# Patient Record
Sex: MALE | Race: WHITE | Employment: PART TIME | URBAN - METROPOLITAN AREA
[De-identification: names, ages, dates, MRNs, and addresses within clinical notes are randomized per-mention and may not be internally consistent; named-entity substitution may affect disease eponyms.]

---

## 2021-04-19 ENCOUNTER — HOSPITAL ENCOUNTER (EMERGENCY)
Facility: CLINIC | Age: 46
Discharge: NURSING, REHAB OR LONG-TERM CARE | End: 2021-04-19
Attending: EMERGENCY MEDICINE | Admitting: EMERGENCY MEDICINE
Payer: MEDICARE

## 2021-04-19 VITALS
SYSTOLIC BLOOD PRESSURE: 119 MMHG | DIASTOLIC BLOOD PRESSURE: 77 MMHG | HEART RATE: 91 BPM | TEMPERATURE: 98.7 F | OXYGEN SATURATION: 99 % | RESPIRATION RATE: 20 BRPM

## 2021-04-19 DIAGNOSIS — R11.2 NON-INTRACTABLE VOMITING WITH NAUSEA, UNSPECIFIED VOMITING TYPE: ICD-10-CM

## 2021-04-19 LAB
ALBUMIN SERPL-MCNC: 3.7 G/DL (ref 3.4–5)
ALP SERPL-CCNC: 84 U/L (ref 40–150)
ALT SERPL W P-5'-P-CCNC: 38 U/L (ref 0–70)
ANION GAP SERPL CALCULATED.3IONS-SCNC: 8 MMOL/L (ref 3–14)
AST SERPL W P-5'-P-CCNC: 26 U/L (ref 0–45)
BASOPHILS # BLD AUTO: 0.1 10E9/L (ref 0–0.2)
BASOPHILS NFR BLD AUTO: 0.5 %
BILIRUB SERPL-MCNC: 0.5 MG/DL (ref 0.2–1.3)
BUN SERPL-MCNC: 11 MG/DL (ref 7–30)
CALCIUM SERPL-MCNC: 8.5 MG/DL (ref 8.5–10.1)
CHLORIDE SERPL-SCNC: 98 MMOL/L (ref 94–109)
CO2 SERPL-SCNC: 25 MMOL/L (ref 20–32)
CREAT SERPL-MCNC: 1.04 MG/DL (ref 0.66–1.25)
DIFFERENTIAL METHOD BLD: ABNORMAL
EOSINOPHIL # BLD AUTO: 0 10E9/L (ref 0–0.7)
EOSINOPHIL NFR BLD AUTO: 0.1 %
ERYTHROCYTE [DISTWIDTH] IN BLOOD BY AUTOMATED COUNT: 12.9 % (ref 10–15)
GFR SERPL CREATININE-BSD FRML MDRD: 86 ML/MIN/{1.73_M2}
GLUCOSE SERPL-MCNC: 128 MG/DL (ref 70–99)
HCT VFR BLD AUTO: 49.2 % (ref 40–53)
HGB BLD-MCNC: 16.5 G/DL (ref 13.3–17.7)
IMM GRANULOCYTES # BLD: 0.1 10E9/L (ref 0–0.4)
IMM GRANULOCYTES NFR BLD: 0.6 %
LABORATORY COMMENT REPORT: NORMAL
LIPASE SERPL-CCNC: 134 U/L (ref 73–393)
LYMPHOCYTES # BLD AUTO: 0.5 10E9/L (ref 0.8–5.3)
LYMPHOCYTES NFR BLD AUTO: 3.8 %
MCH RBC QN AUTO: 30.7 PG (ref 26.5–33)
MCHC RBC AUTO-ENTMCNC: 33.5 G/DL (ref 31.5–36.5)
MCV RBC AUTO: 91 FL (ref 78–100)
MONOCYTES # BLD AUTO: 0.5 10E9/L (ref 0–1.3)
MONOCYTES NFR BLD AUTO: 3.9 %
NEUTROPHILS # BLD AUTO: 11.5 10E9/L (ref 1.6–8.3)
NEUTROPHILS NFR BLD AUTO: 91.1 %
NRBC # BLD AUTO: 0 10*3/UL
NRBC BLD AUTO-RTO: 0 /100
PLATELET # BLD AUTO: 295 10E9/L (ref 150–450)
POTASSIUM SERPL-SCNC: 4.1 MMOL/L (ref 3.4–5.3)
PROT SERPL-MCNC: 7.7 G/DL (ref 6.8–8.8)
RBC # BLD AUTO: 5.38 10E12/L (ref 4.4–5.9)
SARS-COV-2 RNA RESP QL NAA+PROBE: NEGATIVE
SODIUM SERPL-SCNC: 131 MMOL/L (ref 133–144)
SPECIMEN SOURCE: NORMAL
TROPONIN I SERPL-MCNC: <0.015 UG/L (ref 0–0.04)
WBC # BLD AUTO: 12.6 10E9/L (ref 4–11)

## 2021-04-19 PROCEDURE — 258N000003 HC RX IP 258 OP 636: Performed by: EMERGENCY MEDICINE

## 2021-04-19 PROCEDURE — 96360 HYDRATION IV INFUSION INIT: CPT

## 2021-04-19 PROCEDURE — 80053 COMPREHEN METABOLIC PANEL: CPT | Performed by: EMERGENCY MEDICINE

## 2021-04-19 PROCEDURE — 87635 SARS-COV-2 COVID-19 AMP PRB: CPT | Performed by: EMERGENCY MEDICINE

## 2021-04-19 PROCEDURE — 83690 ASSAY OF LIPASE: CPT | Performed by: EMERGENCY MEDICINE

## 2021-04-19 PROCEDURE — 84484 ASSAY OF TROPONIN QUANT: CPT | Performed by: EMERGENCY MEDICINE

## 2021-04-19 PROCEDURE — 93005 ELECTROCARDIOGRAM TRACING: CPT

## 2021-04-19 PROCEDURE — 99284 EMERGENCY DEPT VISIT MOD MDM: CPT | Mod: 25

## 2021-04-19 PROCEDURE — 250N000011 HC RX IP 250 OP 636: Performed by: EMERGENCY MEDICINE

## 2021-04-19 PROCEDURE — 85025 COMPLETE CBC W/AUTO DIFF WBC: CPT | Performed by: EMERGENCY MEDICINE

## 2021-04-19 PROCEDURE — C9803 HOPD COVID-19 SPEC COLLECT: HCPCS

## 2021-04-19 RX ORDER — ONDANSETRON 4 MG/1
4 TABLET, ORALLY DISINTEGRATING ORAL EVERY 8 HOURS PRN
Qty: 10 TABLET | Refills: 0 | Status: SHIPPED | OUTPATIENT
Start: 2021-04-19 | End: 2021-04-22

## 2021-04-19 RX ORDER — ONDANSETRON 4 MG/1
4 TABLET, ORALLY DISINTEGRATING ORAL ONCE
Status: COMPLETED | OUTPATIENT
Start: 2021-04-19 | End: 2021-04-19

## 2021-04-19 RX ADMIN — SODIUM CHLORIDE 1000 ML: 9 INJECTION, SOLUTION INTRAVENOUS at 11:50

## 2021-04-19 RX ADMIN — ONDANSETRON 4 MG: 4 TABLET, ORALLY DISINTEGRATING ORAL at 12:11

## 2021-04-19 ASSESSMENT — ENCOUNTER SYMPTOMS
ABDOMINAL PAIN: 0
SHORTNESS OF BREATH: 0
COUGH: 1
VOMITING: 1
DIARRHEA: 1
HEADACHES: 0

## 2021-04-19 NOTE — ED PROVIDER NOTES
History   Chief Complaint:  Vomiting    The history is limited by the absence of a caregiver.      Henry Benson is a 45 year old male with history of down's syndrome and asthma who presents via EMS from group home with vomiting. Per EMS report the patient has been vomiting for the past 2 days. He lives in a group home and COVID has been going around the facility. He also notes the onset of a dry cough this morning, and endorses intermittent diarrhea. Denies any shortness of breath, abdominal pain, or headache.     Review of Systems   Respiratory: Positive for cough. Negative for shortness of breath.    Gastrointestinal: Positive for diarrhea and vomiting. Negative for abdominal pain.   Neurological: Negative for headaches.   All other systems reviewed and are negative.    Allergies:  Penicillins  Sulfa Drugs    Medications:  The patient is currently on no regular medications.    Past Medical History:    Down's syndrome  Bilateral hearing loss  Tinnitus  Use of hearing aid  Hypertropia  Ptosis eyelid  Pneumonia   Asthma    Past Surgical History:    ENT surgery      Family History:    Glaucoma  Hypertension    Social History:  Patient presents with EMS.  Lives in group home.    Physical Exam     Patient Vitals for the past 24 hrs:   BP Temp Temp src Pulse Resp SpO2   04/19/21 1315 119/77 -- -- 91 -- 99 %   04/19/21 1300 117/77 -- -- 109 -- 94 %   04/19/21 1245 103/67 -- -- 84 -- 96 %   04/19/21 1230 110/79 -- -- 90 -- 97 %   04/19/21 1145 122/85 98.7  F (37.1  C) Oral 92 20 97 %       Physical Exam  General: Patient is awake, alert and interactive when I enter the room  Head: The scalp, face, and head appear normal  Eyes: The pupils are equal, round, and reactive to light. Conjunctivae and sclerae are normal  Neck: Normal range of motion.   CV: Regular rate and rhythm.   Resp: Lungs are clear without wheezes or rales. No respiratory distress.   GI: Abdomen is soft, no rigidity, guarding, or rebound. No  distension. No tenderness to palpation in any quadrant.     MS: Normal tone. Joints grossly normal without effusions. No asymmetric leg swelling, calf or thigh tenderness.    Skin: No rash or lesions noted. Normal capillary refill noted  Neuro: Speech is normal and fluent. Face is symmetric. Moving all extremities.   Psych:  Normal affect.  Appropriate interactions.    Emergency Department Course     ECG  ECG taken at 1227, ECG read at 1233  NSR, Normal ECG   Rate 84 bpm. TX interval 148 ms. QRS duration 86 ms. QT/QTc 356/420 ms. P-R-T axes 43 64 16.     Laboratory:    CBC: WBC: 12.6 (H), HGB: 16.5, PLT: 295  CMP: Glucose 128 (H), Sodium: 131 (L), o/w WNL (Creatinine: 1.04)    Troponin (Collected 1147): <0.015  Lipase: 134    Symptomatic COVID PCR: Negative    Emergency Department Course:    Reviewed:  I reviewed the patient's nursing notes, vitals, past medical records, Care Everywhere.     Assessments:  1213    I evaluated the patient and performed an exam as above.  1415    I updated the patient on results and discussed plan of care.    Interventions:  1211 Zofran, 4 mg, Oral  1150 NS, 1 L, IV    Disposition:  The patient was discharged to home.     Impression & Plan     Medical Decision Making:  Herny Benson is a 45 year old male who presents for evaluation of vomiting. Per EMS report the patient has been vomiting for the past 2 days. He lives in a group home and COVID has been going around the facility. Patient's covid swab is negative. his abdominal exam is benign without any guarding or rebound. Blood is reassuring here today. Given that the patient is otherwise hemodynamically stable without significant hypoxia, I do not believe that the patient requires admission here today. They are at risk for pneumonia but no signs of this are detected on today's visit. Return to the ED for high fevers > 103 for more than 48 hours more, increasing productive cough, shortness of breath, or confusion. Tolerating PO.  There is no signs of serious bacterial infection such as bacteremia, meningitis, UTI/pyelonephritis, strep pharyngitis, etc. I discussed my findings and plan with the patient and they are amenable at this time.  All questions were answered and patient will be discharged home in stable condition.     Covid-19  Henry Benson was evaluated during a global COVID-19 pandemic, which necessitated consideration that the patient might be at risk for infection with the SARS-CoV-2 virus that causes COVID-19.   Applicable protocols for evaluation were followed during the patient's care.   COVID-19 was considered as part of the patient's evaluation. The plan for testing is:  a test was obtained during this visit.    Diagnosis:    ICD-10-CM    1. Non-intractable vomiting with nausea, unspecified vomiting type  R11.2        Discharge Medications:  New Prescriptions    ONDANSETRON (ZOFRAN ODT) 4 MG ODT TAB    Take 1 tablet (4 mg) by mouth every 8 hours as needed for nausea       Scribe Disclosure:  I, Cecille Terrell, am serving as a scribe at 11:45 AM on 4/19/2021 to document services personally performed by Marlon Mcneil MD based on my observations and the provider's statements to me.       Scribe Disclosure:  I, Shannan Beyer, am serving as a scribe at 2:14 PM on 4/19/2021 to document services personally performed by Marlon Mcneil MD based on my observations and the provider's statements to me.         Marlon Mcneil MD  04/20/21 1535

## 2021-04-19 NOTE — ED TRIAGE NOTES
A&O x4.  ABC's intact.      Pt arrives with c/o vomiting for the past 2 days. Pt lives at group home and COVID is going around at home facility.

## 2021-04-20 LAB — INTERPRETATION ECG - MUSE: NORMAL

## 2024-06-13 ENCOUNTER — LAB REQUISITION (OUTPATIENT)
Dept: LAB | Facility: CLINIC | Age: 49
End: 2024-06-13
Payer: COMMERCIAL

## 2024-06-13 DIAGNOSIS — F32.5 MAJOR DEPRESSIVE DISORDER, SINGLE EPISODE, IN FULL REMISSION (H): ICD-10-CM

## 2024-06-13 DIAGNOSIS — G47.00 INSOMNIA, UNSPECIFIED: ICD-10-CM

## 2024-06-13 LAB
ALBUMIN SERPL BCG-MCNC: 4 G/DL (ref 3.5–5.2)
ALP SERPL-CCNC: 84 U/L (ref 40–150)
ALT SERPL W P-5'-P-CCNC: 26 U/L (ref 0–70)
ANION GAP SERPL CALCULATED.3IONS-SCNC: 10 MMOL/L (ref 7–15)
AST SERPL W P-5'-P-CCNC: 31 U/L (ref 0–45)
BASOPHILS # BLD AUTO: 0.1 10E3/UL (ref 0–0.2)
BASOPHILS NFR BLD AUTO: 2 %
BILIRUB SERPL-MCNC: 0.5 MG/DL
BUN SERPL-MCNC: 14.3 MG/DL (ref 6–20)
CALCIUM SERPL-MCNC: 9 MG/DL (ref 8.6–10)
CHLORIDE SERPL-SCNC: 91 MMOL/L (ref 98–107)
CREAT SERPL-MCNC: 1.04 MG/DL (ref 0.67–1.17)
DEPRECATED HCO3 PLAS-SCNC: 27 MMOL/L (ref 22–29)
EGFRCR SERPLBLD CKD-EPI 2021: 89 ML/MIN/1.73M2
EOSINOPHIL # BLD AUTO: 0.1 10E3/UL (ref 0–0.7)
EOSINOPHIL NFR BLD AUTO: 2 %
GLUCOSE SERPL-MCNC: 76 MG/DL (ref 70–99)
HOLD SPECIMEN: NORMAL
IMM GRANULOCYTES # BLD: 0 10E3/UL
IMM GRANULOCYTES NFR BLD: 0 %
LYMPHOCYTES # BLD AUTO: 1.9 10E3/UL (ref 0.8–5.3)
LYMPHOCYTES NFR BLD AUTO: 24 %
MONOCYTES # BLD AUTO: 0.6 10E3/UL (ref 0–1.3)
MONOCYTES NFR BLD AUTO: 8 %
NEUTROPHILS # BLD AUTO: 4.9 10E3/UL (ref 1.6–8.3)
NEUTROPHILS NFR BLD AUTO: 64 %
NRBC # BLD AUTO: 0 10E3/UL
NRBC BLD AUTO-RTO: 0 /100
POTASSIUM SERPL-SCNC: 4.1 MMOL/L (ref 3.4–5.3)
PROT SERPL-MCNC: 7.4 G/DL (ref 6.4–8.3)
SODIUM SERPL-SCNC: 128 MMOL/L (ref 135–145)
TSH SERPL DL<=0.005 MIU/L-ACNC: 0.94 UIU/ML (ref 0.3–4.2)
WBC # BLD AUTO: 7.6 10E3/UL (ref 4–11)

## 2024-06-13 PROCEDURE — 85048 AUTOMATED LEUKOCYTE COUNT: CPT | Mod: ORL

## 2024-06-13 PROCEDURE — 82607 VITAMIN B-12: CPT | Mod: ORL

## 2024-06-13 PROCEDURE — 80061 LIPID PANEL: CPT | Mod: ORL

## 2024-06-13 PROCEDURE — 80053 COMPREHEN METABOLIC PANEL: CPT | Mod: ORL

## 2024-06-13 PROCEDURE — 82306 VITAMIN D 25 HYDROXY: CPT | Mod: ORL

## 2024-06-13 PROCEDURE — 84443 ASSAY THYROID STIM HORMONE: CPT | Mod: ORL

## 2024-06-14 LAB
CHOLEST SERPL-MCNC: 197 MG/DL
FASTING STATUS PATIENT QL REPORTED: NO
HDLC SERPL-MCNC: 53 MG/DL
LDLC SERPL CALC-MCNC: 115 MG/DL
NONHDLC SERPL-MCNC: 144 MG/DL
TRIGL SERPL-MCNC: 143 MG/DL
VIT B12 SERPL-MCNC: 673 PG/ML (ref 232–1245)
VIT D+METAB SERPL-MCNC: 31 NG/ML (ref 20–50)

## 2024-07-18 ENCOUNTER — LAB REQUISITION (OUTPATIENT)
Dept: LAB | Facility: CLINIC | Age: 49
End: 2024-07-18
Payer: COMMERCIAL

## 2024-07-18 DIAGNOSIS — E87.1 HYPO-OSMOLALITY AND HYPONATREMIA: ICD-10-CM

## 2024-07-18 LAB — SODIUM SERPL-SCNC: 133 MMOL/L (ref 135–145)

## 2024-07-18 PROCEDURE — 84295 ASSAY OF SERUM SODIUM: CPT | Mod: ORL

## 2025-01-25 ENCOUNTER — APPOINTMENT (OUTPATIENT)
Dept: GENERAL RADIOLOGY | Facility: CLINIC | Age: 50
End: 2025-01-25
Attending: EMERGENCY MEDICINE
Payer: COMMERCIAL

## 2025-01-25 ENCOUNTER — HOSPITAL ENCOUNTER (INPATIENT)
Facility: CLINIC | Age: 50
LOS: 3 days | Discharge: NURSING, REHAB OR LONG-TERM CARE | End: 2025-01-29
Attending: EMERGENCY MEDICINE | Admitting: INTERNAL MEDICINE
Payer: COMMERCIAL

## 2025-01-25 DIAGNOSIS — J10.1 INFLUENZA A: ICD-10-CM

## 2025-01-25 DIAGNOSIS — E87.1 HYPONATREMIA: ICD-10-CM

## 2025-01-25 DIAGNOSIS — J18.9 PNEUMONIA DUE TO INFECTIOUS ORGANISM, UNSPECIFIED LATERALITY, UNSPECIFIED PART OF LUNG: ICD-10-CM

## 2025-01-25 LAB
ALBUMIN SERPL BCG-MCNC: 3.9 G/DL (ref 3.5–5.2)
ALP SERPL-CCNC: 67 U/L (ref 40–150)
ALT SERPL W P-5'-P-CCNC: 30 U/L (ref 0–70)
ANION GAP SERPL CALCULATED.3IONS-SCNC: 10 MMOL/L (ref 7–15)
AST SERPL W P-5'-P-CCNC: 63 U/L (ref 0–45)
BASOPHILS # BLD AUTO: 0.1 10E3/UL (ref 0–0.2)
BASOPHILS NFR BLD AUTO: 1 %
BILIRUB SERPL-MCNC: 0.5 MG/DL
BUN SERPL-MCNC: 13.9 MG/DL (ref 6–20)
CALCIUM SERPL-MCNC: 8 MG/DL (ref 8.8–10.4)
CHLORIDE SERPL-SCNC: 80 MMOL/L (ref 98–107)
CREAT SERPL-MCNC: 0.98 MG/DL (ref 0.67–1.17)
EGFRCR SERPLBLD CKD-EPI 2021: >90 ML/MIN/1.73M2
EOSINOPHIL # BLD AUTO: 0 10E3/UL (ref 0–0.7)
EOSINOPHIL NFR BLD AUTO: 0 %
ERYTHROCYTE [DISTWIDTH] IN BLOOD BY AUTOMATED COUNT: 13.6 % (ref 10–15)
FLUAV RNA SPEC QL NAA+PROBE: POSITIVE
FLUBV RNA RESP QL NAA+PROBE: NEGATIVE
GLUCOSE SERPL-MCNC: 102 MG/DL (ref 70–99)
HCO3 SERPL-SCNC: 21 MMOL/L (ref 22–29)
HCT VFR BLD AUTO: 35.1 % (ref 40–53)
HGB BLD-MCNC: 12.6 G/DL (ref 13.3–17.7)
IMM GRANULOCYTES # BLD: 0.1 10E3/UL
IMM GRANULOCYTES NFR BLD: 1 %
LYMPHOCYTES # BLD AUTO: 0.4 10E3/UL (ref 0.8–5.3)
LYMPHOCYTES NFR BLD AUTO: 4 %
MCH RBC QN AUTO: 30.3 PG (ref 26.5–33)
MCHC RBC AUTO-ENTMCNC: 35.9 G/DL (ref 31.5–36.5)
MCV RBC AUTO: 84 FL (ref 78–100)
MONOCYTES # BLD AUTO: 0.6 10E3/UL (ref 0–1.3)
MONOCYTES NFR BLD AUTO: 7 %
NEUTROPHILS # BLD AUTO: 8.1 10E3/UL (ref 1.6–8.3)
NEUTROPHILS NFR BLD AUTO: 88 %
NRBC # BLD AUTO: 0 10E3/UL
NRBC BLD AUTO-RTO: 0 /100
PLATELET # BLD AUTO: 178 10E3/UL (ref 150–450)
POTASSIUM SERPL-SCNC: 3.9 MMOL/L (ref 3.4–5.3)
PROT SERPL-MCNC: 6.8 G/DL (ref 6.4–8.3)
RBC # BLD AUTO: 4.16 10E6/UL (ref 4.4–5.9)
RSV RNA SPEC NAA+PROBE: NEGATIVE
SARS-COV-2 RNA RESP QL NAA+PROBE: NEGATIVE
SODIUM SERPL-SCNC: 111 MMOL/L (ref 135–145)
WBC # BLD AUTO: 9.3 10E3/UL (ref 4–11)

## 2025-01-25 PROCEDURE — 99292 CRITICAL CARE ADDL 30 MIN: CPT

## 2025-01-25 PROCEDURE — 250N000009 HC RX 250: Performed by: EMERGENCY MEDICINE

## 2025-01-25 PROCEDURE — 250N000013 HC RX MED GY IP 250 OP 250 PS 637: Performed by: EMERGENCY MEDICINE

## 2025-01-25 PROCEDURE — 96361 HYDRATE IV INFUSION ADD-ON: CPT | Mod: 59

## 2025-01-25 PROCEDURE — 258N000003 HC RX IP 258 OP 636: Performed by: EMERGENCY MEDICINE

## 2025-01-25 PROCEDURE — 99291 CRITICAL CARE FIRST HOUR: CPT | Mod: 25

## 2025-01-25 PROCEDURE — 83930 ASSAY OF BLOOD OSMOLALITY: CPT | Performed by: EMERGENCY MEDICINE

## 2025-01-25 PROCEDURE — 94640 AIRWAY INHALATION TREATMENT: CPT

## 2025-01-25 PROCEDURE — 82310 ASSAY OF CALCIUM: CPT | Performed by: EMERGENCY MEDICINE

## 2025-01-25 PROCEDURE — 82040 ASSAY OF SERUM ALBUMIN: CPT | Performed by: EMERGENCY MEDICINE

## 2025-01-25 PROCEDURE — 71046 X-RAY EXAM CHEST 2 VIEWS: CPT

## 2025-01-25 PROCEDURE — 96375 TX/PRO/DX INJ NEW DRUG ADDON: CPT | Mod: 59

## 2025-01-25 PROCEDURE — 36415 COLL VENOUS BLD VENIPUNCTURE: CPT | Performed by: EMERGENCY MEDICINE

## 2025-01-25 PROCEDURE — 250N000011 HC RX IP 250 OP 636: Performed by: EMERGENCY MEDICINE

## 2025-01-25 PROCEDURE — 85025 COMPLETE CBC W/AUTO DIFF WBC: CPT | Performed by: EMERGENCY MEDICINE

## 2025-01-25 PROCEDURE — 87637 SARSCOV2&INF A&B&RSV AMP PRB: CPT | Performed by: EMERGENCY MEDICINE

## 2025-01-25 RX ORDER — AZITHROMYCIN 500 MG/5ML
500 INJECTION, POWDER, LYOPHILIZED, FOR SOLUTION INTRAVENOUS EVERY 24 HOURS
Status: COMPLETED | OUTPATIENT
Start: 2025-01-25 | End: 2025-01-26

## 2025-01-25 RX ORDER — DEXAMETHASONE SODIUM PHOSPHATE 10 MG/ML
10 INJECTION, SOLUTION INTRAMUSCULAR; INTRAVENOUS ONCE
Status: COMPLETED | OUTPATIENT
Start: 2025-01-25 | End: 2025-01-25

## 2025-01-25 RX ORDER — ONDANSETRON 2 MG/ML
4 INJECTION INTRAMUSCULAR; INTRAVENOUS ONCE
Status: COMPLETED | OUTPATIENT
Start: 2025-01-25 | End: 2025-01-25

## 2025-01-25 RX ORDER — IPRATROPIUM BROMIDE AND ALBUTEROL SULFATE 2.5; .5 MG/3ML; MG/3ML
3 SOLUTION RESPIRATORY (INHALATION) ONCE
Status: COMPLETED | OUTPATIENT
Start: 2025-01-25 | End: 2025-01-25

## 2025-01-25 RX ORDER — ACETAMINOPHEN 325 MG/1
650 TABLET ORAL ONCE
Status: COMPLETED | OUTPATIENT
Start: 2025-01-25 | End: 2025-01-25

## 2025-01-25 RX ORDER — CEFTRIAXONE 1 G/1
1 INJECTION, POWDER, FOR SOLUTION INTRAMUSCULAR; INTRAVENOUS ONCE
Status: COMPLETED | OUTPATIENT
Start: 2025-01-25 | End: 2025-01-26

## 2025-01-25 RX ORDER — OSELTAMIVIR PHOSPHATE 75 MG/1
75 CAPSULE ORAL ONCE
Status: COMPLETED | OUTPATIENT
Start: 2025-01-25 | End: 2025-01-26

## 2025-01-25 RX ADMIN — IPRATROPIUM BROMIDE AND ALBUTEROL SULFATE 3 ML: .5; 3 SOLUTION RESPIRATORY (INHALATION) at 22:30

## 2025-01-25 RX ADMIN — IPRATROPIUM BROMIDE AND ALBUTEROL SULFATE 3 ML: .5; 3 SOLUTION RESPIRATORY (INHALATION) at 23:25

## 2025-01-25 RX ADMIN — SODIUM CHLORIDE 1000 ML: 9 INJECTION, SOLUTION INTRAVENOUS at 22:31

## 2025-01-25 RX ADMIN — DEXAMETHASONE SODIUM PHOSPHATE 10 MG: 10 INJECTION, SOLUTION INTRAMUSCULAR; INTRAVENOUS at 23:24

## 2025-01-25 RX ADMIN — ONDANSETRON 4 MG: 2 INJECTION INTRAMUSCULAR; INTRAVENOUS at 22:31

## 2025-01-25 RX ADMIN — ACETAMINOPHEN 650 MG: 325 TABLET, FILM COATED ORAL at 22:30

## 2025-01-25 ASSESSMENT — COLUMBIA-SUICIDE SEVERITY RATING SCALE - C-SSRS
1. IN THE PAST MONTH, HAVE YOU WISHED YOU WERE DEAD OR WISHED YOU COULD GO TO SLEEP AND NOT WAKE UP?: NO
6. HAVE YOU EVER DONE ANYTHING, STARTED TO DO ANYTHING, OR PREPARED TO DO ANYTHING TO END YOUR LIFE?: NO
2. HAVE YOU ACTUALLY HAD ANY THOUGHTS OF KILLING YOURSELF IN THE PAST MONTH?: NO

## 2025-01-25 ASSESSMENT — ACTIVITIES OF DAILY LIVING (ADL): ADLS_ACUITY_SCORE: 41

## 2025-01-26 PROBLEM — J18.9 CAP (COMMUNITY ACQUIRED PNEUMONIA): Status: ACTIVE | Noted: 2025-01-26

## 2025-01-26 PROBLEM — E87.1 HYPONATREMIA: Status: ACTIVE | Noted: 2025-01-26

## 2025-01-26 PROBLEM — J10.1 INFLUENZA A: Status: ACTIVE | Noted: 2025-01-26

## 2025-01-26 PROBLEM — J18.9 PNEUMONIA DUE TO INFECTIOUS ORGANISM, UNSPECIFIED LATERALITY, UNSPECIFIED PART OF LUNG: Status: ACTIVE | Noted: 2025-01-26

## 2025-01-26 PROBLEM — J96.01 ACUTE HYPOXEMIC RESPIRATORY FAILURE (H): Status: ACTIVE | Noted: 2025-01-26

## 2025-01-26 LAB
ALBUMIN UR-MCNC: NEGATIVE MG/DL
ANION GAP SERPL CALCULATED.3IONS-SCNC: 10 MMOL/L (ref 7–15)
APPEARANCE UR: CLEAR
BASE EXCESS BLDV CALC-SCNC: -0.6 MMOL/L (ref -3–3)
BILIRUB UR QL STRIP: NEGATIVE
BUN SERPL-MCNC: 14.3 MG/DL (ref 6–20)
CALCIUM SERPL-MCNC: 7.9 MG/DL (ref 8.8–10.4)
CHLORIDE SERPL-SCNC: 85 MMOL/L (ref 98–107)
COLOR UR AUTO: ABNORMAL
CREAT SERPL-MCNC: 1.01 MG/DL (ref 0.67–1.17)
EGFRCR SERPLBLD CKD-EPI 2021: >90 ML/MIN/1.73M2
ERYTHROCYTE [DISTWIDTH] IN BLOOD BY AUTOMATED COUNT: 13.9 % (ref 10–15)
GLUCOSE BLDC GLUCOMTR-MCNC: 145 MG/DL (ref 70–99)
GLUCOSE BLDC GLUCOMTR-MCNC: 169 MG/DL (ref 70–99)
GLUCOSE BLDC GLUCOMTR-MCNC: 189 MG/DL (ref 70–99)
GLUCOSE SERPL-MCNC: 146 MG/DL (ref 70–99)
GLUCOSE UR STRIP-MCNC: NEGATIVE MG/DL
HCO3 BLDV-SCNC: 24 MMOL/L (ref 21–28)
HCO3 SERPL-SCNC: 21 MMOL/L (ref 22–29)
HCT VFR BLD AUTO: 34 % (ref 40–53)
HGB BLD-MCNC: 12.2 G/DL (ref 13.3–17.7)
HGB UR QL STRIP: ABNORMAL
KETONES UR STRIP-MCNC: NEGATIVE MG/DL
LEUKOCYTE ESTERASE UR QL STRIP: NEGATIVE
MCH RBC QN AUTO: 30.3 PG (ref 26.5–33)
MCHC RBC AUTO-ENTMCNC: 35.9 G/DL (ref 31.5–36.5)
MCV RBC AUTO: 84 FL (ref 78–100)
NITRATE UR QL: NEGATIVE
O2/TOTAL GAS SETTING VFR VENT: 6 %
OSMOLALITY SERPL: 237 MMOL/KG (ref 275–295)
OSMOLALITY UR: 173 MMOL/KG (ref 100–1200)
OXYHGB MFR BLDV: 93 % (ref 70–75)
PCO2 BLDV: 36 MM HG (ref 40–50)
PH BLDV: 7.43 [PH] (ref 7.32–7.43)
PH UR STRIP: 6.5 [PH] (ref 5–7)
PLATELET # BLD AUTO: 153 10E3/UL (ref 150–450)
PO2 BLDV: 63 MM HG (ref 25–47)
POTASSIUM SERPL-SCNC: 3.6 MMOL/L (ref 3.4–5.3)
RBC # BLD AUTO: 4.03 10E6/UL (ref 4.4–5.9)
RBC URINE: 1 /HPF
SAO2 % BLDV: 94.6 % (ref 70–75)
SODIUM SERPL-SCNC: 114 MMOL/L (ref 135–145)
SODIUM SERPL-SCNC: 116 MMOL/L (ref 135–145)
SODIUM SERPL-SCNC: 116 MMOL/L (ref 135–145)
SODIUM SERPL-SCNC: 124 MMOL/L (ref 135–145)
SODIUM SERPL-SCNC: 127 MMOL/L (ref 135–145)
SODIUM SERPL-SCNC: 128 MMOL/L (ref 135–145)
SODIUM SERPL-SCNC: 130 MMOL/L (ref 135–145)
SODIUM UR-SCNC: <20 MMOL/L
SP GR UR STRIP: 1 (ref 1–1.03)
UROBILINOGEN UR STRIP-MCNC: NORMAL MG/DL
WBC # BLD AUTO: 7.9 10E3/UL (ref 4–11)
WBC URINE: <1 /HPF

## 2025-01-26 PROCEDURE — 36415 COLL VENOUS BLD VENIPUNCTURE: CPT | Performed by: INTERNAL MEDICINE

## 2025-01-26 PROCEDURE — 250N000013 HC RX MED GY IP 250 OP 250 PS 637: Performed by: INTERNAL MEDICINE

## 2025-01-26 PROCEDURE — 85014 HEMATOCRIT: CPT | Performed by: INTERNAL MEDICINE

## 2025-01-26 PROCEDURE — 99223 1ST HOSP IP/OBS HIGH 75: CPT | Performed by: INTERNAL MEDICINE

## 2025-01-26 PROCEDURE — 84300 ASSAY OF URINE SODIUM: CPT | Performed by: INTERNAL MEDICINE

## 2025-01-26 PROCEDURE — 36415 COLL VENOUS BLD VENIPUNCTURE: CPT | Performed by: EMERGENCY MEDICINE

## 2025-01-26 PROCEDURE — 81001 URINALYSIS AUTO W/SCOPE: CPT | Performed by: EMERGENCY MEDICINE

## 2025-01-26 PROCEDURE — 250N000009 HC RX 250: Performed by: EMERGENCY MEDICINE

## 2025-01-26 PROCEDURE — 96367 TX/PROPH/DG ADDL SEQ IV INF: CPT | Mod: 59

## 2025-01-26 PROCEDURE — 250N000013 HC RX MED GY IP 250 OP 250 PS 637: Performed by: EMERGENCY MEDICINE

## 2025-01-26 PROCEDURE — 96375 TX/PRO/DX INJ NEW DRUG ADDON: CPT | Mod: 59

## 2025-01-26 PROCEDURE — 84295 ASSAY OF SERUM SODIUM: CPT | Performed by: INTERNAL MEDICINE

## 2025-01-26 PROCEDURE — 250N000011 HC RX IP 250 OP 636: Performed by: INTERNAL MEDICINE

## 2025-01-26 PROCEDURE — 250N000009 HC RX 250: Performed by: INTERNAL MEDICINE

## 2025-01-26 PROCEDURE — 85041 AUTOMATED RBC COUNT: CPT | Performed by: INTERNAL MEDICINE

## 2025-01-26 PROCEDURE — 258N000003 HC RX IP 258 OP 636: Performed by: INTERNAL MEDICINE

## 2025-01-26 PROCEDURE — 999N000157 HC STATISTIC RCP TIME EA 10 MIN

## 2025-01-26 PROCEDURE — 96365 THER/PROPH/DIAG IV INF INIT: CPT | Mod: 59

## 2025-01-26 PROCEDURE — 250N000011 HC RX IP 250 OP 636: Performed by: EMERGENCY MEDICINE

## 2025-01-26 PROCEDURE — 80048 BASIC METABOLIC PNL TOTAL CA: CPT | Performed by: INTERNAL MEDICINE

## 2025-01-26 PROCEDURE — 120N000004 HC R&B MS OVERFLOW

## 2025-01-26 PROCEDURE — 80051 ELECTROLYTE PANEL: CPT | Performed by: INTERNAL MEDICINE

## 2025-01-26 PROCEDURE — 83935 ASSAY OF URINE OSMOLALITY: CPT | Performed by: INTERNAL MEDICINE

## 2025-01-26 PROCEDURE — 94640 AIRWAY INHALATION TREATMENT: CPT

## 2025-01-26 PROCEDURE — 94640 AIRWAY INHALATION TREATMENT: CPT | Mod: 76

## 2025-01-26 PROCEDURE — 99222 1ST HOSP IP/OBS MODERATE 55: CPT | Performed by: INTERNAL MEDICINE

## 2025-01-26 PROCEDURE — 82805 BLOOD GASES W/O2 SATURATION: CPT | Performed by: EMERGENCY MEDICINE

## 2025-01-26 RX ORDER — MEMANTINE HYDROCHLORIDE 10 MG/1
10 TABLET ORAL 2 TIMES DAILY
COMMUNITY

## 2025-01-26 RX ORDER — ONDANSETRON 4 MG/1
4 TABLET, ORALLY DISINTEGRATING ORAL EVERY 8 HOURS PRN
Status: DISCONTINUED | OUTPATIENT
Start: 2025-01-26 | End: 2025-01-26

## 2025-01-26 RX ORDER — ENOXAPARIN SODIUM 100 MG/ML
40 INJECTION SUBCUTANEOUS EVERY 24 HOURS
Status: DISCONTINUED | OUTPATIENT
Start: 2025-01-26 | End: 2025-01-29 | Stop reason: HOSPADM

## 2025-01-26 RX ORDER — ONDANSETRON 4 MG/1
4 TABLET, ORALLY DISINTEGRATING ORAL EVERY 8 HOURS PRN
COMMUNITY

## 2025-01-26 RX ORDER — CETIRIZINE HYDROCHLORIDE 10 MG/1
10 TABLET ORAL DAILY
Status: DISCONTINUED | OUTPATIENT
Start: 2025-01-26 | End: 2025-01-29 | Stop reason: HOSPADM

## 2025-01-26 RX ORDER — CALCIUM CARBONATE 500(1250)
1 TABLET ORAL 2 TIMES DAILY
COMMUNITY

## 2025-01-26 RX ORDER — DEXTROSE MONOHYDRATE 50 MG/ML
INJECTION, SOLUTION INTRAVENOUS CONTINUOUS
Status: DISCONTINUED | OUTPATIENT
Start: 2025-01-26 | End: 2025-01-27

## 2025-01-26 RX ORDER — MOMETASONE FUROATE 1 MG/G
CREAM TOPICAL DAILY
COMMUNITY

## 2025-01-26 RX ORDER — CARBOXYMETHYLCELLULOSE SODIUM 5 MG/ML
1 SOLUTION/ DROPS OPHTHALMIC
Status: DISCONTINUED | OUTPATIENT
Start: 2025-01-26 | End: 2025-01-29 | Stop reason: HOSPADM

## 2025-01-26 RX ORDER — OLANZAPINE 10 MG/1
2.5 INJECTION, POWDER, LYOPHILIZED, FOR SOLUTION INTRAMUSCULAR ONCE
Status: DISCONTINUED | OUTPATIENT
Start: 2025-01-26 | End: 2025-01-26

## 2025-01-26 RX ORDER — DEXTROSE MONOHYDRATE 25 G/50ML
25-50 INJECTION, SOLUTION INTRAVENOUS
Status: DISCONTINUED | OUTPATIENT
Start: 2025-01-26 | End: 2025-01-29 | Stop reason: HOSPADM

## 2025-01-26 RX ORDER — DONEPEZIL HYDROCHLORIDE 5 MG/1
10 TABLET, FILM COATED ORAL AT BEDTIME
Status: DISCONTINUED | OUTPATIENT
Start: 2025-01-26 | End: 2025-01-29 | Stop reason: HOSPADM

## 2025-01-26 RX ORDER — CALCIUM CARBONATE 500(1250)
1 TABLET ORAL 2 TIMES DAILY
Status: DISCONTINUED | OUTPATIENT
Start: 2025-01-26 | End: 2025-01-29 | Stop reason: HOSPADM

## 2025-01-26 RX ORDER — NICOTINE POLACRILEX 4 MG
15-30 LOZENGE BUCCAL
Status: DISCONTINUED | OUTPATIENT
Start: 2025-01-26 | End: 2025-01-29 | Stop reason: HOSPADM

## 2025-01-26 RX ORDER — AMOXICILLIN 250 MG
1 CAPSULE ORAL 2 TIMES DAILY PRN
Status: DISCONTINUED | OUTPATIENT
Start: 2025-01-26 | End: 2025-01-29 | Stop reason: HOSPADM

## 2025-01-26 RX ORDER — ONDANSETRON 4 MG/1
4 TABLET, ORALLY DISINTEGRATING ORAL EVERY 6 HOURS PRN
Status: DISCONTINUED | OUTPATIENT
Start: 2025-01-26 | End: 2025-01-29 | Stop reason: HOSPADM

## 2025-01-26 RX ORDER — OSELTAMIVIR PHOSPHATE 75 MG/1
75 CAPSULE ORAL 2 TIMES DAILY
Status: DISCONTINUED | OUTPATIENT
Start: 2025-01-26 | End: 2025-01-29 | Stop reason: HOSPADM

## 2025-01-26 RX ORDER — AZITHROMYCIN 250 MG/1
250 TABLET, FILM COATED ORAL EVERY EVENING
Status: DISCONTINUED | OUTPATIENT
Start: 2025-01-26 | End: 2025-01-29 | Stop reason: HOSPADM

## 2025-01-26 RX ORDER — MEMANTINE HYDROCHLORIDE 5 MG/1
10 TABLET ORAL 2 TIMES DAILY
Status: DISCONTINUED | OUTPATIENT
Start: 2025-01-26 | End: 2025-01-29 | Stop reason: HOSPADM

## 2025-01-26 RX ORDER — SODIUM CHLORIDE 9 MG/ML
INJECTION, SOLUTION INTRAVENOUS CONTINUOUS
Status: DISCONTINUED | OUTPATIENT
Start: 2025-01-26 | End: 2025-01-26

## 2025-01-26 RX ORDER — DONEPEZIL HYDROCHLORIDE 10 MG/1
10 TABLET, FILM COATED ORAL AT BEDTIME
COMMUNITY

## 2025-01-26 RX ORDER — ACETAMINOPHEN 500 MG
1000 TABLET ORAL 3 TIMES DAILY
Status: DISCONTINUED | OUTPATIENT
Start: 2025-01-26 | End: 2025-01-29 | Stop reason: HOSPADM

## 2025-01-26 RX ORDER — GUAIFENESIN 200 MG/10ML
200 LIQUID ORAL EVERY 4 HOURS PRN
Status: DISCONTINUED | OUTPATIENT
Start: 2025-01-26 | End: 2025-01-29 | Stop reason: HOSPADM

## 2025-01-26 RX ORDER — BENZONATATE 100 MG/1
100 CAPSULE ORAL 3 TIMES DAILY PRN
Status: DISCONTINUED | OUTPATIENT
Start: 2025-01-26 | End: 2025-01-29 | Stop reason: HOSPADM

## 2025-01-26 RX ORDER — IPRATROPIUM BROMIDE AND ALBUTEROL SULFATE 2.5; .5 MG/3ML; MG/3ML
3 SOLUTION RESPIRATORY (INHALATION) EVERY 4 HOURS PRN
Status: DISCONTINUED | OUTPATIENT
Start: 2025-01-26 | End: 2025-01-29 | Stop reason: HOSPADM

## 2025-01-26 RX ORDER — ESCITALOPRAM OXALATE 20 MG/1
20 TABLET ORAL DAILY
Status: DISCONTINUED | OUTPATIENT
Start: 2025-01-26 | End: 2025-01-29 | Stop reason: HOSPADM

## 2025-01-26 RX ORDER — FLUTICASONE PROPIONATE 50 MCG
1 SPRAY, SUSPENSION (ML) NASAL DAILY
Status: DISCONTINUED | OUTPATIENT
Start: 2025-01-27 | End: 2025-01-26

## 2025-01-26 RX ORDER — FLUTICASONE PROPIONATE 50 MCG
1 SPRAY, SUSPENSION (ML) NASAL DAILY
Status: DISCONTINUED | OUTPATIENT
Start: 2025-01-26 | End: 2025-01-29 | Stop reason: HOSPADM

## 2025-01-26 RX ORDER — ACETYLCYSTEINE 200 MG/ML
2 SOLUTION ORAL; RESPIRATORY (INHALATION) 3 TIMES DAILY
Status: DISCONTINUED | OUTPATIENT
Start: 2025-01-26 | End: 2025-01-29 | Stop reason: HOSPADM

## 2025-01-26 RX ORDER — IBUPROFEN 200 MG
200 TABLET ORAL EVERY 4 HOURS PRN
COMMUNITY

## 2025-01-26 RX ORDER — ACETAMINOPHEN 500 MG
1000 TABLET ORAL 3 TIMES DAILY
COMMUNITY

## 2025-01-26 RX ORDER — AMOXICILLIN 250 MG
2 CAPSULE ORAL 2 TIMES DAILY PRN
Status: DISCONTINUED | OUTPATIENT
Start: 2025-01-26 | End: 2025-01-29 | Stop reason: HOSPADM

## 2025-01-26 RX ORDER — FLUTICASONE PROPIONATE 50 MCG
1 SPRAY, SUSPENSION (ML) NASAL DAILY
COMMUNITY

## 2025-01-26 RX ORDER — LIDOCAINE 40 MG/G
CREAM TOPICAL
Status: DISCONTINUED | OUTPATIENT
Start: 2025-01-26 | End: 2025-01-29 | Stop reason: HOSPADM

## 2025-01-26 RX ORDER — CALCIUM CARBONATE 500 MG/1
1000 TABLET, CHEWABLE ORAL 4 TIMES DAILY PRN
Status: DISCONTINUED | OUTPATIENT
Start: 2025-01-26 | End: 2025-01-29 | Stop reason: HOSPADM

## 2025-01-26 RX ORDER — BENZOCAINE/MENTHOL 6 MG-10 MG
LOZENGE MUCOUS MEMBRANE 2 TIMES DAILY PRN
COMMUNITY

## 2025-01-26 RX ORDER — DILTIAZEM HYDROCHLORIDE 240 MG/1
240 CAPSULE, EXTENDED RELEASE ORAL DAILY
Status: DISCONTINUED | OUTPATIENT
Start: 2025-01-26 | End: 2025-01-26 | Stop reason: ALTCHOICE

## 2025-01-26 RX ORDER — CHOLECALCIFEROL (VITAMIN D3) 125 MCG
9000-27000 CAPSULE ORAL
Status: DISCONTINUED | OUTPATIENT
Start: 2025-01-26 | End: 2025-01-29 | Stop reason: HOSPADM

## 2025-01-26 RX ORDER — PROCHLORPERAZINE MALEATE 5 MG/1
10 TABLET ORAL EVERY 6 HOURS PRN
Status: DISCONTINUED | OUTPATIENT
Start: 2025-01-26 | End: 2025-01-29 | Stop reason: HOSPADM

## 2025-01-26 RX ORDER — TACROLIMUS 0.3 MG/G
OINTMENT TOPICAL 2 TIMES DAILY PRN
COMMUNITY

## 2025-01-26 RX ORDER — CETIRIZINE HYDROCHLORIDE 10 MG/1
10 TABLET ORAL DAILY
COMMUNITY

## 2025-01-26 RX ORDER — CEFTRIAXONE 2 G/1
2 INJECTION, POWDER, FOR SOLUTION INTRAMUSCULAR; INTRAVENOUS EVERY 24 HOURS
Status: DISCONTINUED | OUTPATIENT
Start: 2025-01-26 | End: 2025-01-28

## 2025-01-26 RX ORDER — ACETAMINOPHEN 325 MG/1
975 TABLET ORAL EVERY 6 HOURS PRN
Status: DISCONTINUED | OUTPATIENT
Start: 2025-01-26 | End: 2025-01-29 | Stop reason: HOSPADM

## 2025-01-26 RX ORDER — ESCITALOPRAM OXALATE 20 MG/1
20 TABLET ORAL DAILY
COMMUNITY

## 2025-01-26 RX ORDER — ACETYLCYSTEINE 200 MG/ML
2 SOLUTION ORAL; RESPIRATORY (INHALATION) EVERY 4 HOURS
Status: DISCONTINUED | OUTPATIENT
Start: 2025-01-26 | End: 2025-01-26

## 2025-01-26 RX ORDER — LOPERAMIDE HYDROCHLORIDE 2 MG/1
2 CAPSULE ORAL 4 TIMES DAILY PRN
Status: DISCONTINUED | OUTPATIENT
Start: 2025-01-26 | End: 2025-01-29 | Stop reason: HOSPADM

## 2025-01-26 RX ORDER — CHOLECALCIFEROL (VITAMIN D3) 125 MCG
9000-27000 CAPSULE ORAL
COMMUNITY

## 2025-01-26 RX ORDER — ONDANSETRON 2 MG/ML
4 INJECTION INTRAMUSCULAR; INTRAVENOUS EVERY 6 HOURS PRN
Status: DISCONTINUED | OUTPATIENT
Start: 2025-01-26 | End: 2025-01-29 | Stop reason: HOSPADM

## 2025-01-26 RX ORDER — ALBUTEROL SULFATE 0.83 MG/ML
2.5 SOLUTION RESPIRATORY (INHALATION)
Status: DISCONTINUED | OUTPATIENT
Start: 2025-01-26 | End: 2025-01-29 | Stop reason: HOSPADM

## 2025-01-26 RX ORDER — DILTIAZEM HYDROCHLORIDE 120 MG/1
240 CAPSULE, COATED, EXTENDED RELEASE ORAL DAILY
Status: DISCONTINUED | OUTPATIENT
Start: 2025-01-26 | End: 2025-01-28

## 2025-01-26 RX ADMIN — OSELTAMIVIR PHOSPHATE 75 MG: 75 CAPSULE ORAL at 00:11

## 2025-01-26 RX ADMIN — CALCIUM 500 MG: 500 TABLET ORAL at 12:50

## 2025-01-26 RX ADMIN — ALBUTEROL SULFATE 2.5 MG: 2.5 SOLUTION RESPIRATORY (INHALATION) at 07:19

## 2025-01-26 RX ADMIN — ACETYLCYSTEINE 2 ML: 200 SOLUTION ORAL; RESPIRATORY (INHALATION) at 20:31

## 2025-01-26 RX ADMIN — CEFTRIAXONE 2 G: 2 INJECTION, POWDER, FOR SOLUTION INTRAMUSCULAR; INTRAVENOUS at 12:50

## 2025-01-26 RX ADMIN — ACETAMINOPHEN 1000 MG: 500 TABLET, FILM COATED ORAL at 23:59

## 2025-01-26 RX ADMIN — DEXTROSE MONOHYDRATE: 50 INJECTION, SOLUTION INTRAVENOUS at 15:17

## 2025-01-26 RX ADMIN — SODIUM CHLORIDE: 9 INJECTION, SOLUTION INTRAVENOUS at 05:14

## 2025-01-26 RX ADMIN — CALCIUM 500 MG: 500 TABLET ORAL at 17:18

## 2025-01-26 RX ADMIN — MEMANTINE 10 MG: 5 TABLET ORAL at 10:06

## 2025-01-26 RX ADMIN — MEMANTINE 10 MG: 5 TABLET ORAL at 20:30

## 2025-01-26 RX ADMIN — ALBUTEROL SULFATE 2.5 MG: 2.5 SOLUTION RESPIRATORY (INHALATION) at 15:47

## 2025-01-26 RX ADMIN — DILTIAZEM HYDROCHLORIDE 240 MG: 240 CAPSULE, EXTENDED RELEASE ORAL at 10:06

## 2025-01-26 RX ADMIN — ACETAMINOPHEN 1000 MG: 500 TABLET, FILM COATED ORAL at 17:18

## 2025-01-26 RX ADMIN — FLUTICASONE PROPIONATE 1 SPRAY: 50 SPRAY, METERED NASAL at 10:07

## 2025-01-26 RX ADMIN — CEFTRIAXONE 1 G: 1 INJECTION, POWDER, FOR SOLUTION INTRAMUSCULAR; INTRAVENOUS at 00:01

## 2025-01-26 RX ADMIN — ALBUTEROL SULFATE 2.5 MG: 2.5 SOLUTION RESPIRATORY (INHALATION) at 20:31

## 2025-01-26 RX ADMIN — CETIRIZINE HYDROCHLORIDE 10 MG: 10 TABLET, FILM COATED ORAL at 10:06

## 2025-01-26 RX ADMIN — LACTASE TAB 3000 UNIT 9000 UNITS: 3000 TAB at 12:51

## 2025-01-26 RX ADMIN — OSELTAMIVIR PHOSPHATE 75 MG: 75 CAPSULE ORAL at 08:15

## 2025-01-26 RX ADMIN — LACTASE TAB 3000 UNIT 9000 UNITS: 3000 TAB at 17:18

## 2025-01-26 RX ADMIN — ALBUTEROL SULFATE 2.5 MG: 2.5 SOLUTION RESPIRATORY (INHALATION) at 01:29

## 2025-01-26 RX ADMIN — DONEPEZIL HYDROCHLORIDE 10 MG: 10 TABLET ORAL at 23:59

## 2025-01-26 RX ADMIN — AZITHROMYCIN MONOHYDRATE 500 MG: 500 INJECTION, POWDER, LYOPHILIZED, FOR SOLUTION INTRAVENOUS at 00:19

## 2025-01-26 RX ADMIN — AZITHROMYCIN DIHYDRATE 250 MG: 250 TABLET ORAL at 20:29

## 2025-01-26 RX ADMIN — OSELTAMIVIR PHOSPHATE 75 MG: 75 CAPSULE ORAL at 20:29

## 2025-01-26 RX ADMIN — ACETYLCYSTEINE 2 ML: 200 SOLUTION ORAL; RESPIRATORY (INHALATION) at 15:47

## 2025-01-26 RX ADMIN — ACETYLCYSTEINE 2 ML: 200 SOLUTION ORAL; RESPIRATORY (INHALATION) at 07:18

## 2025-01-26 RX ADMIN — ENOXAPARIN SODIUM 40 MG: 40 INJECTION SUBCUTANEOUS at 15:17

## 2025-01-26 RX ADMIN — ACETAMINOPHEN 1000 MG: 500 TABLET, FILM COATED ORAL at 12:50

## 2025-01-26 RX ADMIN — ACETYLCYSTEINE 2 ML: 200 SOLUTION ORAL; RESPIRATORY (INHALATION) at 01:44

## 2025-01-26 RX ADMIN — ESCITALOPRAM OXALATE 20 MG: 20 TABLET ORAL at 12:50

## 2025-01-26 ASSESSMENT — ACTIVITIES OF DAILY LIVING (ADL)
ADLS_ACUITY_SCORE: 70
ADLS_ACUITY_SCORE: 70
ADLS_ACUITY_SCORE: 74
ADLS_ACUITY_SCORE: 70
ADLS_ACUITY_SCORE: 70
DEPENDENT_IADLS:: CLEANING;COOKING;LAUNDRY;SHOPPING;MEAL PREPARATION;MEDICATION MANAGEMENT;MONEY MANAGEMENT;TRANSPORTATION
ADLS_ACUITY_SCORE: 70
ADLS_ACUITY_SCORE: 74
ADLS_ACUITY_SCORE: 41
ADLS_ACUITY_SCORE: 41
ADLS_ACUITY_SCORE: 70
ADLS_ACUITY_SCORE: 41
ADLS_ACUITY_SCORE: 70
ADLS_ACUITY_SCORE: 70
ADLS_ACUITY_SCORE: 41
ADLS_ACUITY_SCORE: 70

## 2025-01-26 NOTE — PROGRESS NOTES
RT was called to place patient on HFNC. When arrived patient was on 6L NC with SPO2 of 96%. RN was concerned about patient pulling the nasal cannula off. Patient was placed on tender  adhesive to keep the nasal cannula in place.      Beny Graff, RT, RT  1/26/2025 12:24 AM

## 2025-01-26 NOTE — PHARMACY-ADMISSION MEDICATION HISTORY
Pharmacist Admission Medication History    Admission medication history is complete. The information provided in this note is only as accurate as the sources available at the time of the update.    Information Source(s): Facility (U/NH/) medication list/MAR and CareEverywhere/SureScripts via  fax    Pertinent Information: Patient resides in group home    Changes made to PTA medication list:  Added: Tylenol, calcium, dairy aid, escitalopram, Flonase, hydrocortisone cream, ibuprofen, mometasone cream, fiber lax powder, ondansetron, tacrolimus ointment, vitamin D3  Deleted: Norco  Changed: melatonin    Allergies reviewed with patient and updates made in EHR: yes    Medication History Completed By: Abebe SimmonsD 1/26/2025 9:08 AM    PTA Med List   Medication Sig Last Dose/Taking    acetaminophen (TYLENOL) 500 MG tablet Take 1,000 mg by mouth 3 times daily. 1/25/2025    calcium carbonate (OS-GUERRERO) 500 MG TABS Take 1 tablet by mouth 2 times daily. At 0800 and 1700 1/25/2025    Calcium Polycarbophil (FIBER LAXATIVE PO) Take by mouth daily as needed (consitpation). Taking As Needed    cetirizine (ZYRTEC) 10 MG tablet Take 10 mg by mouth daily. 1/25/2025    diltiazem (TIAZAC) 240 MG 24 hr ER beaded capsule Take by mouth daily 1/25/2025    donepezil (ARICEPT) 10 MG tablet Take 10 mg by mouth at bedtime. 1/25/2025    escitalopram (LEXAPRO) 20 MG tablet Take 20 mg by mouth daily. 1/25/2025    fluticasone (FLONASE) 50 MCG/ACT nasal spray Spray 1 spray into both nostrils daily. 1/25/2025    hydrocortisone (CORTAID) 1 % external cream Apply topically 2 times daily as needed for rash or itching. Taking As Needed    ibuprofen (ADVIL/MOTRIN) 200 MG tablet Take 200 mg by mouth every 4 hours as needed for pain. Taking As Needed    lactase (LACTAID) 3000 UNIT tablet Take 9,000-27,000 Units by mouth 3 times daily (with meals). 1/25/2025    melatonin 5 MG tablet Take 5 mg by mouth at bedtime. Past Week    memantine (NAMENDA)  10 MG tablet Take 10 mg by mouth 2 times daily. 1/25/2025    mometasone (ELOCON) 0.1 % external cream Apply topically daily. 1/25/2025    ondansetron (ZOFRAN ODT) 4 MG ODT tab Take 4 mg by mouth every 8 hours as needed for nausea. Taking As Needed    tacrolimus (PROTOPIC) 0.03 % external ointment Apply topically 2 times daily as needed (rash and skin irritation). Taking As Needed    vitamin D3 (CHOLECALCIFEROL) 250 mcg (66220 units) capsule Take 1 capsule by mouth daily. 1/25/2025

## 2025-01-26 NOTE — PROGRESS NOTES
Patient was seen and examined by me at bedside this morning.  History and physical completed by my partner Dr. Dunn was reviewed.  Patient is a 49-year-old male with history of Down syndrome and dementia who was admitted from group home due to acute febrile illness with influenza, pneumonia and hyponatremia.  Patient was initially treated with BiPAP and closely monitored in the ICU.  Currently he is on 4 L of oxygen, awake alert and comfortable.  His serum sodium is improving we will continue normal saline at 75 cc an hour  Will continue supplemental oxygen, wean down as able.  May advance diet as tolerated  May transfer to the floor to continue current care plan.

## 2025-01-26 NOTE — PROGRESS NOTES
ICU End of Shift Summary.  For vital signs and complete assessments, please see documentation flowsheets.      Pertinent assessments: Patient somnolent upon arriving to unit at approx 0400. Difficult to fully assess orientation due to hearing loss, somnolence, etc. VSS on 6L via NC, blood pressures on the softer side. Tele: SR. Lung sounds coarse throughout. Incontinent of bowel and bladder. External cath in place. No major skin issues but patient does have dry, cracked hands and feet.     Major Shift Events: admitted to ICU.    Plan (Upcoming Events): wean O2 as able. Antibiotics. Monitor sodium.    Discharge/Transfer Needs: CM to assist with discharge.      Bedside Shift Report Completed : Y  Bedside Safety Check Completed: Y

## 2025-01-26 NOTE — ED NOTES
[facility]  ED Nurse Handoff Report    ED Chief complaint: Nausea & Vomiting  . ED Diagnosis:   Final diagnoses:   Hyponatremia       Allergies:   Allergies   Allergen Reactions    Influenza Vaccines     Pcn [Penicillins] Hives    Sulfa Antibiotics Hives       Code Status: Full Code    Activity level - Baseline/Home:  independent.  Activity Level - Current:   assist of 1.   Lift room needed: No.   Bariatric: No   Needed: No   Isolation: Yes.   Infection: Not Applicable  Influenza.     Respiratory status: Oximask    Vital Signs (within 30 minutes):   Vitals:    01/26/25 0000 01/26/25 0020 01/26/25 0030 01/26/25 0100   BP: 111/54  112/62 96/50   Pulse: 88 90 96 88   Resp:       Temp: 100.8  F (38.2  C)      TempSrc: Oral      SpO2: 97%  (!) 91% (!) 82%   Weight:           Cardiac Rhythm:  ,      Pain level:    Patient confused: Yes. - cognitive impairment at baseline   Patient Falls Risk: activity supervised.   Elimination Status: Unable to void     Patient Report - Initial Complaint: brought from  by , report pt has been vomiting 4 times today, no other complains .   Focused Assessment: Henry Benson is a 49 year old male with who presents to the ED with nausea and vomiting. Patient presents from his group home after vomiting 4 times today. Per the , he has been having fever as well. Patient also endorses diarrhea. He states he is not in pain. History is limited due to downs syndrome and group home status.     Pt is hypoxic at 82% RA,      Abnormal Results:   Labs Ordered and Resulted from Time of ED Arrival to Time of ED Departure   COMPREHENSIVE METABOLIC PANEL - Abnormal       Result Value    Sodium 111 (*)     Potassium 3.9      Carbon Dioxide (CO2) 21 (*)     Anion Gap 10      Urea Nitrogen 13.9      Creatinine 0.98      GFR Estimate >90      Calcium 8.0 (*)     Chloride 80 (*)     Glucose 102 (*)     Alkaline Phosphatase 67      AST 63 (*)     ALT  30      Protein Total 6.8      Albumin 3.9      Bilirubin Total 0.5     INFLUENZA A/B, RSV AND SARS-COV2 PCR - Abnormal    Influenza A PCR Positive (*)     Influenza B PCR Negative      RSV PCR Negative      SARS CoV2 PCR Negative     CBC WITH PLATELETS AND DIFFERENTIAL - Abnormal    WBC Count 9.3      RBC Count 4.16 (*)     Hemoglobin 12.6 (*)     Hematocrit 35.1 (*)     MCV 84      MCH 30.3      MCHC 35.9      RDW 13.6      Platelet Count 178      % Neutrophils 88      % Lymphocytes 4      % Monocytes 7      % Eosinophils 0      % Basophils 1      % Immature Granulocytes 1      NRBCs per 100 WBC 0      Absolute Neutrophils 8.1      Absolute Lymphocytes 0.4 (*)     Absolute Monocytes 0.6      Absolute Eosinophils 0.0      Absolute Basophils 0.1      Absolute Immature Granulocytes 0.1      Absolute NRBCs 0.0     BLOOD GAS VENOUS - Abnormal    pH Venous 7.43      pCO2 Venous 36 (*)     pO2 Venous 63 (*)     Bicarbonate Venous 24      Base Excess/Deficit Venous -0.6      FIO2 6      Oxyhemoglobin Venous 93 (*)     O2 Sat, Venous 94.6 (*)    ROUTINE UA WITH MICROSCOPIC REFLEX TO CULTURE   OSMOLALITY   OSMOLALITY, RANDOM URINE   SODIUM RANDOM URINE   SODIUM        Chest XR,  PA & LAT   Final Result   IMPRESSION: Abnormal parenchymal opacity in the perihilar right upper lobe and in the left lower lobe suspected to represent bilateral pneumonia. Otherwise negative chest x-ray.          Treatments provided: NA   Family Comments:     OBS brochure/video discussed/provided to patient:  N/A  ED Medications:   Medications   azithromycin (ZITHROMAX) 500 mg in  mL intermittent infusion (500 mg Intravenous $New Bag 1/26/25 0019)   oseltamivir (TAMIFLU) capsule 75 mg (has no administration in time range)   cefTRIAXone (ROCEPHIN) 2 g vial to attach to  ml bag for ADULTS or NS 50 ml bag for PEDS (has no administration in time range)   azithromycin (ZITHROMAX) tablet 250 mg (has no administration in time  range)   benzonatate (TESSALON) capsule 100 mg (has no administration in time range)   guaiFENesin (ROBITUSSIN) 20 mg/mL solution 200 mg (has no administration in time range)   albuterol (PROVENTIL) neb solution 2.5 mg (has no administration in time range)   lidocaine 1 % 0.1-1 mL (has no administration in time range)   lidocaine (LMX4) cream (has no administration in time range)   sodium chloride (PF) 0.9% PF flush 3 mL (has no administration in time range)   sodium chloride (PF) 0.9% PF flush 3 mL (has no administration in time range)   senna-docusate (SENOKOT-S/PERICOLACE) 8.6-50 MG per tablet 1 tablet (has no administration in time range)     Or   senna-docusate (SENOKOT-S/PERICOLACE) 8.6-50 MG per tablet 2 tablet (has no administration in time range)   calcium carbonate (TUMS) chewable tablet 1,000 mg (has no administration in time range)   acetaminophen (TYLENOL) tablet 975 mg (has no administration in time range)   melatonin tablet 5 mg (has no administration in time range)   ondansetron (ZOFRAN ODT) ODT tab 4 mg (has no administration in time range)     Or   ondansetron (ZOFRAN) injection 4 mg (has no administration in time range)   prochlorperazine (COMPAZINE) injection 10 mg (has no administration in time range)     Or   prochlorperazine (COMPAZINE) tablet 10 mg (has no administration in time range)   loperamide (IMODIUM) capsule 2 mg (has no administration in time range)   sodium chloride 0.9% BOLUS 1,000 mL (0 mLs Intravenous Stopped 1/26/25 0019)   ipratropium - albuterol 0.5 mg/2.5 mg/3 mL (DUONEB) neb solution 3 mL (3 mLs Nebulization $Given 1/25/25 2230)   ondansetron (ZOFRAN) injection 4 mg (4 mg Intravenous $Given 1/25/25 2231)   acetaminophen (TYLENOL) tablet 650 mg (650 mg Oral $Given 1/25/25 2230)   ipratropium - albuterol 0.5 mg/2.5 mg/3 mL (DUONEB) neb solution 3 mL (3 mLs Nebulization $Given 1/25/25 2325)   dexAMETHasone PF (DECADRON) injection 10 mg (10 mg Intravenous $Given 1/25/25 2328)    oseltamivir (TAMIFLU) capsule 75 mg (75 mg Oral $Given 1/26/25 0011)   cefTRIAXone (ROCEPHIN) 1 g vial to attach to  mL bag for ADULTS or NS 50 mL bag for PEDS (0 g Intravenous Stopped 1/26/25 0019)       Drips infusing:  No  For the majority of the shift this patient was Green.   Interventions performed were NA .    Sepsis treatment initiated: No    Cares/treatment/interventions/medications to be completed following ED care: na     ED Nurse Name: Tani Crockett RN  1:09 AM

## 2025-01-26 NOTE — PLAN OF CARE
"ICU End of Shift Summary.  For vital signs and complete assessments, please see documentation flowsheets.     Pertinent assessments: disorentied to situation, tele SR. Voiding via urinal. Incont of stool. Dry cracked skin. Tolerating a regular diet.   Lines: PIV x1  Drips: D5@100  Major Shift Events: - weaned to room air.   Plan (Upcoming Events): continue abx. Trend sodium.   Discharge/Transfer Needs: TBD    Bedside Shift Report Completed : Y  Bedside Safety Check Completed: Y           Goal Outcome Evaluation:      Plan of Care Reviewed With: patient    Overall Patient Progress: improvingOverall Patient Progress: improving    Outcome Evaluation: NC weaned to 4L      Problem: Adult Inpatient Plan of Care  Goal: Plan of Care Review  Description: The Plan of Care Review/Shift note should be completed every shift.  The Outcome Evaluation is a brief statement about your assessment that the patient is improving, declining, or no change.  This information will be displayed automatically on your shift  note.  Outcome: Progressing  Flowsheets (Taken 1/26/2025 1436)  Outcome Evaluation: NC weaned to 4L  Plan of Care Reviewed With: patient  Overall Patient Progress: improving  Goal: Patient-Specific Goal (Individualized)  Description: You can add care plan individualizations to a care plan. Examples of Individualization might be:  \"Parent requests to be called daily at 9am for status\", \"I have a hard time hearing out of my right ear\", or \"Do not touch me to wake me up as it startles  me\".  Outcome: Progressing  Goal: Absence of Hospital-Acquired Illness or Injury  Outcome: Progressing  Intervention: Identify and Manage Fall Risk  Recent Flowsheet Documentation  Taken 1/26/2025 0800 by Harmony Golden RN  Safety Promotion/Fall Prevention:   activity supervised   clutter free environment maintained   increase visualization of patient   increased rounding and observation   lighting adjusted   nonskid shoes/slippers when out of " bed   room near nurse's station   safety round/check completed  Intervention: Prevent Skin Injury  Recent Flowsheet Documentation  Taken 1/26/2025 0800 by Harmony Golden RN  Body Position: position changed independently  Intervention: Prevent and Manage VTE (Venous Thromboembolism) Risk  Recent Flowsheet Documentation  Taken 1/26/2025 0800 by Harmony Golden RN  VTE Prevention/Management: SCDs off (sequential compression devices)  Goal: Optimal Comfort and Wellbeing  Outcome: Progressing  Intervention: Provide Person-Centered Care  Recent Flowsheet Documentation  Taken 1/26/2025 0800 by Harmony Golden RN  Trust Relationship/Rapport:   care explained   choices provided   questions encouraged   reassurance provided  Goal: Readiness for Transition of Care  Outcome: Progressing     Problem: Gas Exchange Impaired  Goal: Optimal Gas Exchange  Outcome: Progressing  Intervention: Optimize Oxygenation and Ventilation  Recent Flowsheet Documentation  Taken 1/26/2025 0800 by Harmony Golden RN  Head of Bed (HOB) Positioning: HOB at 30-45 degrees     Problem: Electrolyte Imbalance  Goal: Electrolyte Balance  Outcome: Progressing     Problem: Comorbidity Management  Goal: Blood Pressure in Desired Range  Outcome: Progressing  Intervention: Maintain Blood Pressure Management  Recent Flowsheet Documentation  Taken 1/26/2025 0800 by Harmony Golden RN  Medication Review/Management: medications reviewed     Problem: Pneumonia  Goal: Fluid Balance  Outcome: Progressing  Goal: Resolution of Infection Signs and Symptoms  Outcome: Progressing  Goal: Effective Oxygenation and Ventilation  Outcome: Progressing  Intervention: Promote Airway Secretion Clearance  Recent Flowsheet Documentation  Taken 1/26/2025 0800 by Harmony Golden RN  Cough And Deep Breathing: done with encouragement  Activity Management:   activity adjusted per tolerance   activity encouraged  Intervention: Optimize Oxygenation and Ventilation  Recent Flowsheet  Documentation  Taken 1/26/2025 0800 by Harmony Golden, RN  Head of Bed (HOB) Positioning: HOB at 30-45 degrees

## 2025-01-26 NOTE — CONSULTS
Care Management Initial Consult    General Information  Assessment completed with: Other ([facility] - [phone]), Rukhsana  Type of CM/SW Visit: Initial Assessment    Primary Care Provider verified and updated as needed: Yes (PCP is [facility])   Readmission within the last 30 days: no previous admission in last 30 days      Reason for Consult: discharge planning  Advance Care Planning:            Communication Assessment  Patient's communication style: spoken language (English or Bilingual)             Cognitive  Cognitive/Neuro/Behavioral: .WDL except  Level of Consciousness: alert  Arousal Level: opens eyes spontaneously  Orientation: disoriented to, situation  Mood/Behavior: behavior appropriate to situation     Speech: garbled, spontaneous    Living Environment:   People in home: other (see comments) (Lives at [facility])     Current living Arrangements: group home      Able to return to prior arrangements: yes  Living Arrangement Comments: Lives at [facility] - [phone]    Family/Social Support:  Care provided by: other (see comments) (Staff at [facility])  Provides care for: no one, unable/limited ability to care for self  Marital Status: Single  Support system: Facility resident(s)/Staff          Description of Support System: Supportive, Involved    Support Assessment: Adequate family and caregiver support    Current Resources:   Patient receiving home care services: No        Community Resources: [facility] (Cadi Waiver YOLI [email or web address])  Equipment currently used at home:    Supplies currently used at home:      Employment/Financial:  Employment Status: disabled, employed part-time       Does the patient's insurance plan have a 3 day qualifying hospital stay waiver?  No    Lifestyle & Psychosocial Needs:  Social Drivers of Health     Food Insecurity: No Food Insecurity  (11/8/2023)    Received from [facility]    Food Insecurity     Do you worry your food will run out before you are able to buy more?: 1   Depression: Not at risk (12/21/2021)    Received from [facility]    PHQ-2     PHQ-2 TOTAL SCORE: 1   Housing Stability: Low Risk  (11/8/2023)    Received from [facility] [employer]    Housing Stability     What is your housing situation today?: 1   Tobacco Use: Unknown (1/26/2025)    Patient History     Smoking Tobacco Use: Never     Smokeless Tobacco Use: Unknown     Passive Exposure: Not on file   Financial Resource Strain: Low Risk  (11/8/2023)    Received from [facility]    Financial Resource Strain     Difficulty of Paying Living Expenses: 3     Difficulty of Paying Living Expenses: Not on file   Alcohol Use: Not on file   Transportation Needs: No Transportation Needs (11/8/2023)    Received from [facility]    Transportation Needs     Does lack of transportation keep you from medical appointments?: 1     Does lack of transportation keep you from work, meetings or getting things that you need?: 1   Physical Activity: Not on file   Interpersonal Safety: Not on file   Stress: Not on file   Social Connections: Socially Integrated (11/8/2023)    Received from [facility]    Social Connections     Do you often feel lonely or isolated from those around you?: 0   Health Literacy: Not on file       Functional Status:  Prior to admission patient needed assistance:   Dependent ADLs:: Independent  Dependent IADLs:: Cleaning, Cooking, Laundry, Shopping, Meal Preparation, Medication Management, Money Management, Transportation  Assesssment of Functional Status: Not at  functional baseline      Additional Information:  ZIGGY spoke to Group Home Mgr Rukhsana.  She reported that Henry is on Cadi Waiver.     Group Home can provide transportation with van any time after Mon 01/27.  Rukhsana confirmed that PCP is now with [facility] in the [facility], name is unknown.  Rukhsana confirmed that Henry does have a Guardian.      ZIGGY called number for Kari Gallegos -  states she is with Legal Assistance.      ZIGGY researched EPIC EMR - [facility] records show a Guardian Carolina Baker - [phone] but phone is disconnected.   When [facility] asked [facility] for Guardian contact information - she provided Cadi CM contact instead -  [email or web address].    Next Steps:     ZIGGY emailed Cadi CM - [email or web address] asking for current Guardian contact information to update Guardian and update contact information.    Inform [facility] Rkuhsana when Henry is medically ready for discharge.   Rukhsana can pick him up with [facility] van Mon 01/27 or after.   Need fax to send discharge instructions.    Update PCP to [facility] once name of  Is known.    Franchesca Rankin, LALA  [phone]

## 2025-01-26 NOTE — CONSULTS
[facility]    RENAL CONSULTATION NOTE    REFERRING MD:  Dr. Graham    REASON FOR CONSULTATION:  severe hyponatremia    DATE OF CONSULTATION: 01/26/25    SHORTHAND KEY FOR MY NOTES:  c = with, s = without, p = after, a = before, x = except, asx = asymptomatic, tx = transplant or treatment, sx = symptoms or symptomatic, cx = canceled or culture, rxn = reaction, yday = yesterday, nl = normal, abx = antibiotics, fxn = function, dx = diagnosis, dz = disease, m/h = melena/hematochezia, c/d/l/ha = cramping/dizziness/lightheadedness/headache, d/c = discharge or diarrhea/constipation, f/c/n/v = fevers/chills/nausea/vomiting, cp/sob = chest pain/shortness of breath, tbv = total body volume, rxn = reaction, tdc = tunneled dialysis catheter, pta = prior to admission, hd = hemodialysis, pd = peritoneal dialysis, hhd = home hemodialysis, edw = estimated dry wt    HPI: Henry Benson is a 49 year old male c d/o Down's who was admitted on 1/25/2025 c severe hyponatremia, influenza A, and pneumonia.  Notes from Mariah Clements (ER) and Shaun (Hosp) were reviewed.    Pt presented from his group home c having f/c/n/v/d.  He was noted to have a Na of 111, was positive for influenza A, and his CXR showed B infiltrates.  He was started on oseltamivir + azithro + ceftriaxone + dexamethasone.  He was also given a 1L NS bolus in the ER and when the repeat Na chandan to 114, he was started on a NS gtt.  This AM, the Na was up to 124 p ~12h.  Fluids were stopped and a stat Na was 127.    Pt is lying in bed and is in NAD.  He is able to interact ok, but baseline is unknown.    ROS:  Unknown    PMH:    Past Medical History:   Diagnosis Date    Down's syndrome     Essential hypertension     Hearing loss     Hyponatremia     Psoriasis      PSH:    Past Surgical History:   Procedure Laterality Date    ENT SURGERY       MEDICATIONS:    Current Facility-Administered Medications   Medication Dose Route Frequency Provider Last Rate  Last Admin    acetaminophen (TYLENOL) tablet 1,000 mg  1,000 mg Oral TID Dave Graham MD   1,000 mg at 01/26/25 1250    acetylcysteine (MUCOMYST) 20 % nebulizer solution 2 mL  2 mL Nebulization TID Richard Dunn MD   2 mL at 01/26/25 0718    azithromycin (ZITHROMAX) tablet 250 mg  250 mg Oral QPM Richard Dunn MD        calcium carbonate 500 mg (elemental) (OSCAL) tablet 500 mg  1 tablet Oral BID Dave Graham MD   500 mg at 01/26/25 1250    cefTRIAXone (ROCEPHIN) 2 g vial to attach to  ml bag for ADULTS or NS 50 ml bag for PEDS  2 g Intravenous Q24H Richard Dunn MD   2 g at 01/26/25 1250    cetirizine (zyrTEC) tablet 10 mg  10 mg Oral Daily Richard Dunn MD   10 mg at 01/26/25 1006    diltiazem ER COATED BEADS (CARDIZEM CD/CARTIA XT) 24 hr capsule 240 mg  240 mg Oral Daily Richard Dunn MD   240 mg at 01/26/25 1006    donepezil (ARICEPT) tablet 10 mg  10 mg Oral At Bedtime Richard Dunn MD        enoxaparin ANTICOAGULANT (LOVENOX) injection 40 mg  40 mg Subcutaneous Q24H Dave Graham MD        escitalopram (LEXAPRO) tablet 20 mg  20 mg Oral Daily Dave Graham MD   20 mg at 01/26/25 1250    fluticasone (FLONASE) 50 MCG/ACT spray 1 spray  1 spray Both Nostrils Daily Richard Dunn MD   1 spray at 01/26/25 1007    lactase (LACTAID) tablet 9,000-27,000 Units  9,000-27,000 Units Oral TID w/meals Dave Graham MD   9,000 Units at 01/26/25 1251    memantine (NAMENDA) tablet 10 mg  10 mg Oral BID Richard Dunn MD   10 mg at 01/26/25 1006    oseltamivir (TAMIFLU) capsule 75 mg  75 mg Oral BID Richard Dunn MD   75 mg at 01/26/25 0815    sodium chloride (PF) 0.9% PF flush 3 mL  3 mL Intracatheter Q8H Richard Dunn MD         ALLERGIES:    Allergies as of 01/25/2025 - Reviewed 01/25/2025   Allergen Reaction Noted    Influenza vaccines  01/25/2025    Pcn [penicillins] Hives 02/26/2015    Sulfa antibiotics  "Hives 02/26/2015     FH:    No family history on file.  SH:    Social History     Socioeconomic History    Marital status: Single     Spouse name: Not on file    Number of children: Not on file    Years of education: Not on file    Highest education level: Not on file   Occupational History    Not on file   Tobacco Use    Smoking status: Never    Smokeless tobacco: Not on file   Substance and Sexual Activity    Alcohol use: No    Drug use: Not on file    Sexual activity: Not on file   Other Topics Concern    Not on file   Social History Narrative    Not on file     Social Drivers of Health     Financial Resource Strain: Low Risk  (11/8/2023)    Received from [facility]    Financial Resource Strain     Difficulty of Paying Living Expenses: 3     Difficulty of Paying Living Expenses: Not on file   Food Insecurity: No Food Insecurity (11/8/2023)    Received from [employer] [facility]    Food Insecurity     Do you worry your food will run out before you are able to buy more?: 1   Transportation Needs: No Transportation Needs (11/8/2023)    [facility]    Transportation Needs     Does lack of transportation keep you from medical appointments?: 1     Does lack of transportation keep you from work, meetings or getting things that you need?: 1   Physical Activity: Not on file   Stress: Not on file   Social Connections: Socially Integrated (11/8/2023)    Received from [employer] [facility]    Social Connections     Do you often feel lonely or isolated from those around you?: 0   Interpersonal Safety: Not on file   Housing Stability: Low Risk  (11/8/2023)    Received from [employer] [facility]    Housing Stability     What is your housing situation today?: 1     PHYSICAL EXAM:    BP 92/59   Pulse 73   Temp 99.1  F (37.3  C) (Oral)   Resp 11   Ht 1.702 m (5' 7\")   Wt 78.7 kg " (173 lb 8 oz)   SpO2 95%   BMI 27.17 kg/m      GENERAL: awake, alert, NAD  HEENT:  normocephalic, [place]  CV: RRR, nl S1/S2; no significant ble edema  RESP: few coarse BS  GI: abd o/s/nt/nd  MUSCULOSKELETAL: extremities nl - no gross deformities noted  SKIN: no suspicious lesions or rashes, dry to touch  NEURO:  moves all extremities  PSYCH: mood good, affect appropriate    LABS:      CBC RESULTS:     Recent Labs   Lab 01/26/25  0536 01/25/25  2229   WBC 7.9 9.3   RBC 4.03* 4.16*   HGB 12.2* 12.6*   HCT 34.0* 35.1*    178     BMP RESULTS:  Recent Labs   Lab 01/26/25  1337 01/26/25  1206 01/26/25  1111 01/26/25  0821 01/26/25  0536 01/26/25  0254 01/25/25  2229   *  --  124*  --  116*  116* 114* 111*   POTASSIUM  --   --   --   --  3.6  --  3.9   CHLORIDE  --   --   --   --  85*  --  80*   CO2  --   --   --   --  21*  --  21*   BUN  --   --   --   --  14.3  --  13.9   CR  --   --   --   --  1.01  --  0.98   GLC  --  189*  --  169* 146*  --  102*   GUERRERO  --   --   --   --  7.9*  --  8.0*     INRNo lab results found in last 7 days.     DIAGNOSTICS:  Personally reviewed:  CXR - B infiltrates    A/P:  Henry Benson is a 49 year old male c severe hyponatremia, influenza A, and pneumonia.    1.  Severe hyponatremia.  Pt has a h/o chronic mild hyponatremia (128-131) who has an acute component primarily caused by poor intake due to n/v.  The nausea + pulm infiltrates could also trigger ADH release so he may have a component of SIADH, too.  Margaret was < 20 so it favors the depletional etiology.  He responded to the NS and is correcting quickly.  Though this is an acute episode and he is not exhibiting any neuro signs, we will still plan to slow down the correction c hypotonic fluids.  A.  Start D5 @ 100 ml/h.  B.  Continue Na checks q 4h.  C.  Follow neuro exam.  D.  Watch water intake.  No need to restrict yet.    2.  Influenza A + pneumonia.  Pt is on proper abx/antiviral.  A.  Follow clinically.    3.  FEN.  Pt  is on a lactose-free diet.  A.  No water restriction.    Thank you for this consultation. We will follow c you.  Please call if any questions.  Case d/w Dr. Graham.    Attestation:   I have reviewed today's relevant vital signs, notes, medications, labs and imaging.    Randall Weathers MD  [facility] - Nephrology  [phone]

## 2025-01-26 NOTE — ED PROVIDER NOTES
Emergency Department Note      History of Present Illness     Chief Complaint   Nausea & Vomiting    HPI   Henry Benson is a 49 year old male with who presents to the ED with nausea and vomiting. Patient presents from his group home after vomiting 4 times today. Per the , he has been having fever as well. Patient also endorses diarrhea. He states he is not in pain. History is limited due to downs syndrome and group home status.    Independent Historian    as detailed above.    Review of External Notes     Past Medical History     Medical History and Problem List   Down's syndrome   Mixed conductive and sensorineural hearing loss   Psoriasis   Hypertropia     Medications   Tiazac  Cardizem   Namenda  Aricept   Lexapro     Surgical History   ENT surgery    Physical Exam     Patient Vitals for the past 24 hrs:   BP Temp Temp src Pulse Resp SpO2 Weight   01/26/25 0020 -- -- -- 90 -- -- --   01/26/25 0000 111/54 100.8  F (38.2  C) Oral 88 -- 97 % --   01/25/25 2330 114/55 -- -- 102 -- 95 % --   01/25/25 2320 -- -- -- -- -- (!) 87 % --   01/25/25 2215 132/81 -- -- -- -- (!) 91 % --   01/25/25 2201 125/69 100.3  F (37.9  C) Temporal 86 24 (!) 91 % 78.7 kg (173 lb 8 oz)     Physical Exam  Vitals reviewed.   HENT:      Nose: Nose normal.      Mouth/Throat:      Mouth: Mucous membranes are dry.   Eyes:      Pupils: Pupils are equal, round, and reactive to light.   Cardiovascular:      Rate and Rhythm: Normal rate and regular rhythm.   Pulmonary:      Breath sounds: Wheezing and rhonchi present.   Abdominal:      General: Abdomen is flat. Bowel sounds are normal.   Musculoskeletal:         General: Normal range of motion.   Skin:     General: Skin is warm.      Capillary Refill: Capillary refill takes less than 2 seconds.   Neurological:      General: No focal deficit present.      Mental Status: He is alert. Mental status is at baseline.   Psychiatric:         Mood and Affect: Mood normal.            Diagnostics     Lab Results   Labs Ordered and Resulted from Time of ED Arrival to Time of ED Departure   COMPREHENSIVE METABOLIC PANEL - Abnormal       Result Value    Sodium 111 (*)     Potassium 3.9      Carbon Dioxide (CO2) 21 (*)     Anion Gap 10      Urea Nitrogen 13.9      Creatinine 0.98      GFR Estimate >90      Calcium 8.0 (*)     Chloride 80 (*)     Glucose 102 (*)     Alkaline Phosphatase 67      AST 63 (*)     ALT 30      Protein Total 6.8      Albumin 3.9      Bilirubin Total 0.5     INFLUENZA A/B, RSV AND SARS-COV2 PCR - Abnormal    Influenza A PCR Positive (*)     Influenza B PCR Negative      RSV PCR Negative      SARS CoV2 PCR Negative     CBC WITH PLATELETS AND DIFFERENTIAL - Abnormal    WBC Count 9.3      RBC Count 4.16 (*)     Hemoglobin 12.6 (*)     Hematocrit 35.1 (*)     MCV 84      MCH 30.3      MCHC 35.9      RDW 13.6      Platelet Count 178      % Neutrophils 88      % Lymphocytes 4      % Monocytes 7      % Eosinophils 0      % Basophils 1      % Immature Granulocytes 1      NRBCs per 100 WBC 0      Absolute Neutrophils 8.1      Absolute Lymphocytes 0.4 (*)     Absolute Monocytes 0.6      Absolute Eosinophils 0.0      Absolute Basophils 0.1      Absolute Immature Granulocytes 0.1      Absolute NRBCs 0.0     BLOOD GAS VENOUS - Abnormal    pH Venous 7.43      pCO2 Venous 36 (*)     pO2 Venous 63 (*)     Bicarbonate Venous 24      Base Excess/Deficit Venous -0.6      FIO2 6      Oxyhemoglobin Venous 93 (*)     O2 Sat, Venous 94.6 (*)    ROUTINE UA WITH MICROSCOPIC REFLEX TO CULTURE   OSMOLALITY   OSMOLALITY, RANDOM URINE   SODIUM RANDOM URINE     Imaging   Chest XR,  PA & LAT   Final Result   IMPRESSION: Abnormal parenchymal opacity in the perihilar right upper lobe and in the left lower lobe suspected to represent bilateral pneumonia. Otherwise negative chest x-ray.        EKG   None     Independent Interpretation   CXR: Right sided infiltrate.    Right-sided infiltrate.  ED  Course      Medications Administered   Medications   azithromycin (ZITHROMAX) 500 mg in  mL intermittent infusion (500 mg Intravenous $New Bag 1/26/25 0019)   oseltamivir (TAMIFLU) capsule 75 mg (has no administration in time range)   cefTRIAXone (ROCEPHIN) 2 g vial to attach to  ml bag for ADULTS or NS 50 ml bag for PEDS (has no administration in time range)   azithromycin (ZITHROMAX) tablet 250 mg (has no administration in time range)   benzonatate (TESSALON) capsule 100 mg (has no administration in time range)   guaiFENesin (ROBITUSSIN) 20 mg/mL solution 200 mg (has no administration in time range)   albuterol (PROVENTIL) neb solution 2.5 mg (has no administration in time range)   lidocaine 1 % 0.1-1 mL (has no administration in time range)   lidocaine (LMX4) cream (has no administration in time range)   sodium chloride (PF) 0.9% PF flush 3 mL (has no administration in time range)   sodium chloride (PF) 0.9% PF flush 3 mL (has no administration in time range)   senna-docusate (SENOKOT-S/PERICOLACE) 8.6-50 MG per tablet 1 tablet (has no administration in time range)     Or   senna-docusate (SENOKOT-S/PERICOLACE) 8.6-50 MG per tablet 2 tablet (has no administration in time range)   calcium carbonate (TUMS) chewable tablet 1,000 mg (has no administration in time range)   acetaminophen (TYLENOL) tablet 975 mg (has no administration in time range)   melatonin tablet 5 mg (has no administration in time range)   ondansetron (ZOFRAN ODT) ODT tab 4 mg (has no administration in time range)     Or   ondansetron (ZOFRAN) injection 4 mg (has no administration in time range)   prochlorperazine (COMPAZINE) injection 10 mg (has no administration in time range)     Or   prochlorperazine (COMPAZINE) tablet 10 mg (has no administration in time range)   loperamide (IMODIUM) capsule 2 mg (has no administration in time range)   sodium chloride 0.9% BOLUS 1,000 mL (0 mLs Intravenous Stopped 1/26/25 0019)   ipratropium -  albuterol 0.5 mg/2.5 mg/3 mL (DUONEB) neb solution 3 mL (3 mLs Nebulization $Given 1/25/25 2230)   ondansetron (ZOFRAN) injection 4 mg (4 mg Intravenous $Given 1/25/25 2231)   acetaminophen (TYLENOL) tablet 650 mg (650 mg Oral $Given 1/25/25 2230)   ipratropium - albuterol 0.5 mg/2.5 mg/3 mL (DUONEB) neb solution 3 mL (3 mLs Nebulization $Given 1/25/25 2325)   dexAMETHasone PF (DECADRON) injection 10 mg (10 mg Intravenous $Given 1/25/25 2324)   oseltamivir (TAMIFLU) capsule 75 mg (75 mg Oral $Given 1/26/25 0011)   cefTRIAXone (ROCEPHIN) 1 g vial to attach to  mL bag for ADULTS or NS 50 mL bag for PEDS (0 g Intravenous Stopped 1/26/25 0019)     Procedures   None      Discussion of Management   Admitting Hospitalist, Dr. Dunn    ED Course   ED Course as of 01/26/25 0034   Sat Jan 25, 2025 2217 I obtained history and examined the patient as noted above.     Sun Jan 26, 2025   0005 I spoke with Dr. Dunn, hospitalist, who accepts the patient.       Additional Documentation  None    Medical Decision Making / Diagnosis     CMS Diagnoses: IV Antibiotics given and/or elevated Lactate of 0 and no sepsis note found - Delete this reminder and enter the sepsis note or '.edcms' before signing chart.>>>None    MIPS       None    [facility]   Henry Benson is a 49 year old male who presents from a group home with increased shortness of breath as well as vomiting.  Patient is mildly tachypneic.  Noted to be hypoxic.  Is low-grade febrile.  Test do confirm influenza A.  The patient has profound respiratory failure with hypoxia.  Continues to have coarse breath sounds and a lot of phlegm.  DuoNebs were given without relief.  Mucomyst given without relief.  Continued to be tachypneic and requiring oxygen.  Due to oxygen needs will require admission.  Lab work is profoundly abnormal with a profoundly low sodium with.  Patient has been vomiting could be partially hypovolemic.  Could be related to excess water intake.  No group  home staff in the emergency room to assist in more history details.  Regardless due to profoundly low sodium in the setting of respiratory failure with hypoxia and concerns for pneumonia in the setting of influenza recommend admission.  Due to patient's guarded state and potential need for BiPAP recommended ICU admission.  Care was discussed with the hospitalist.    Disposition   The patient was admitted to the hospital.     Diagnosis     ICD-10-CM    1. Hyponatremia  E87.1       2. Pneumonia due to infectious organism, unspecified laterality, unspecified part of lung  J18.9       3. Influenza A  J10.1         Scribe Disclosure:  IHeriberto, am serving as a scribe at 10:30 PM on 1/25/2025 to document services personally performed by Sami Clements MD based on my observations and the provider's statements to me.     Scribe Disclosure:  IGOR HERNANDEZ, am serving as a scribe  at 10:30 PM on 1/25/2025 to document services personally performed by Sami Clements MD based on my observations and the provider's statements to me.      Sami Clements MD  01/26/25 0321

## 2025-01-26 NOTE — H&P
[facility]  Hospitalist Admission Note  Name: Henry Benson    MRN: [id number]  YOB: 1975    Age: 49 year old  Date of admission: 1/25/2025  Primary care provider: Pallas, Kenneth G    Chief Complaint:  vomiting, fever    Assessment and Plan:   Acute hypoxemic respiratory failure  Influenza A infection  Possible aspiration pneumonia: Presenting from his group home after vomiting 4 times today and he had a fever.  He also reported some diarrhea.  Coughing audibly during my encounter with upper respiratory gurgling audible from outside the room.  Progressive hypoxia in the ER into the low 80s now requiring 15 L by facemask.  Temperature 100.3  F.  Influenza A PCR is positive.  Severe hyponatremia as below.  No leukocytosis.  Chest x-ray does show right upper lobe perihilar and left lower lobe opacities consistent with bilateral pneumonia.  Suspect he may have aspirated and has developed aspiration pneumonitis or aspiration pneumonia.  He received Tamiflu, IV ceftriaxone/azithromycin, 2 DuoNebs, and 10 mg IV dexamethasone in the ER.  Per ER note from 4/2021 history included asthma, but I do not see any documentation of this and he is not wheezing currently.  -ICU admission given progressive respiratory failure and severe hyponatremia  -May need BiPAP initiation soon, anticipate he would need some sedation medications to tolerate this as he was already pulling at his supplemental oxygen.  N.p.o. initially given tenuous respiratory status.  -Tamiflu 75 mg twice daily  -IV ceftriaxone 2 g every 24 hours and oral Z-Genaro  -Tessalon Perlesas needed for cough  -IV Zofran and IV Compazine as needed for nausea  -Albuterol nebs as needed for wheezing  -Mucomyst nebs 3 times daily for significant congestion  -Resume PTA Flonase and Zyrtec 10 mg daily    Severe hyponatremia: Initial sodium level low at 111, chloride 80, creatinine 0.98.  Has had mild hyponatremia in the past ranging from 128-133 last  year.  Suspect there is some acute hyponatremia as well due to his vomiting.  It is unclear what his fluid intake is normally in the  was not very knowledgeable in regards to this.  Possible component of SIADH with his acute pulmonary process as well.  -Received 1 L NS, awaiting repeat sodium level before making any other fluid adjustments.  Ordered sodium level checks every 4 hours  Addendum: Repeat sodium level is up 3 points to 114 after the 1 L NS bolus.  Would like to bring his sodium level up slowly and safely, but when he does this level would prefer to get him closer to 120 over the next 6-8 hours.  Start IV NS at 100 mL/h and recheck sodium level in 4 hours  -Given degree of hyponatremia, will involve nephrology  -Seizure precautions  -Added on urine osmolality and sodium level yet to be collected, this was following 1 L NS bolus in the ER    Down syndrome  Dementia  Hearing loss: Lives in a group home setting.  He is on Namenda 10 mg twice daily and donepezil 10 mg at bedtime a presumably also has early onset dementia which is common in Down syndrome.  Multiple times removed his supplemental oxygen in the ER, but when someone is present he is cooperative and calm.  -Resume Namenda and donepezil  -If requires BiPAP anticipate he will need some sedation    HTN: Resume PTA diltiazem  mg daily.    Lactose intolerant: When allowed diet recommend no lactose diet or providing Lactaid.    DVT Prophylaxis: Pneumatic Compression Devices  Code Status: Presumed full Code as his group home documentation does not include any about CODE STATUS and I am uncertain who is legal guardian is based on his paperwork.  FEN: N.p.o. initially given tenuous respiratory status.  Discharge Dispo: Back to group home  Estimated Disch Date / # of Days until Disch: Admit inpatient to the ICU due to progressive respiratory failure in the ER, may need BiPAP, needs close supervision with Down syndrome and removing his  supplemental oxygen, and he has severe hyponatremia.  Anticipate 3-5 night hospitalization.      History of Present Illness:  Henry Benson is a 49 year old male with PMH including Down syndrome, dementia?,  Hearing loss, psoriasis, HTN, and mild hyponatremia who presents from his group home for vomiting and fevers.  Per report he has vomited 4 times today and had a fever.  The patient himself reported some diarrhea.  He denies cough or shortness of breath, but he is audibly coughing during my encounter and he is quite hypoxic.  Denies any pain at this time.  The  who is on was not very familiar with the events of the day or the patient's history.  It is unclear from his paperwork to his legal guardian is and there is no CODE STATUS listed.    History obtained from patient, medical record, and from Dr. Clements in the emergency department.  Blood pressure 125/69, heart rate 86, temperature 100.3  F.  Progressive respiratory failure in the ER with O2 sats in the low 80s now on 15 L via facemask.  His labs showed WBC 9.3, hemoglobin 12.6, platelet count 178.  His sodium is severely low at 111 with chloride 80 and creatinine 0.98.  Blood glucose 102.  AST 63 otherwise LFTs normal.  Influenza A PCR is positive.  Chest x-ray shows opacities in the right perihilar right upper lobe and left lower lobe consistent with bilateral pneumonia.  Suspect he may have aspirated.  He received 1 g IV ceftriaxone, 500 mg IV azithromycin, 2 DuoNebs, Zofran, 1 L NS bolus, Tamiflu 75 mg, acetaminophen, and 10 mg dexamethasone IV.  With progressive respiratory failure adding Mucomyst nebs now to try to break up his audible secretions.  Changing inpatient admission to ICU status given worsening respiratory failure and severe hypernatremia in setting of Down syndrome and already having issues with removing his supplemental oxygen.  May need to initiate BiPAP.       Clinically Significant Risk Factors Present on Admission          # Hyponatremia: Lowest Na = 111 mmol/L in last 2 days, will monitor as appropriate  # Hypochloremia: Lowest Cl = 80 mmol/L in last 2 days, will monitor as appropriate  # Hypocalcemia: Lowest Ca = 8 mg/dL in last 2 days, will monitor and replace as appropriate                                        Past Medical History reviewed:  Past Medical History:   Diagnosis Date    Down's syndrome     Essential hypertension     Hearing loss     Hyponatremia     Psoriasis      Past Surgical History reviewed:  Past Surgical History:   Procedure Laterality Date    ENT SURGERY       Social History reviewed:  Social History     Tobacco Use    Smoking status: Never    Smokeless tobacco: Not on file   Substance Use Topics    Alcohol use: No     Social History     Social History Narrative    Not on file   Lives in group home    Family History reviewed:  Patient unable to provide this history secondary to somnolence and Down syndrome    Allergies:  Allergies   Allergen Reactions    Influenza Vaccines     Pcn [Penicillins] Hives    Sulfa Antibiotics Hives     Medications:  Prior to Admission medications    Medication Sig Last Dose Taking? Auth Provider Long Term End Date   donepezil (ARICEPT) 10 MG tablet Take 10 mg by mouth at bedtime.  Yes Reported, Patient     melatonin 5 MG tablet Take 5 mg by mouth nightly as needed for sleep.  Yes Reported, Patient     memantine (NAMENDA) 10 MG tablet Take 10 mg by mouth 2 times daily.  Yes Reported, Patient     cetirizine (ZYRTEC) 10 MG tablet Take 10 mg by mouth daily.   Reported, Patient     diltiazem (TIAZAC) 240 MG 24 hr ER beaded capsule Take by mouth daily   Reported, Patient Yes        D[facility] Perry, ROGE CNP     Flonase daily    Review of Systems:  A Comprehensive greater than 10 system review of systems was carried out.  Pertinent positives and negatives are noted above.  Otherwise negative.     Physical Exam:  Blood pressure 110/65, pulse 71, temperature 100.8  F (38.2  C),  temperature source Oral, resp. rate 24, weight 78.7 kg (173 lb 8 oz), SpO2 96%.  Wt Readings from Last 1 Encounters:   01/25/25 78.7 kg (173 lb 8 oz)     Exam:  Constitutional: Awake, appears acutely ill  Eyes: Mild scleral injection  HEENT: Dry mucous membranes  Respiratory: Audible gurgling upper airway noises from the doorway, coughing during encounter, bilateral rhonchi, no wheeze, on 15 L via facemask  Cardiovascular: RRR, no murmur  GI: non-tender, not distended, bowel sounds present  Skin: no rash   Musculoskeletal/extremities: No edema  Neurologic: Mildly somnolent, did answer some questions for me  Psychiatric: calm when someone is in the room with him, but then starts removing oxygen when no one is there if he is awake    Lab and imaging data personally reviewed:  Labs:  Recent Labs   Lab 01/26/25  0009   PHV 7.43   PO2V 63*   PCO2V 36*   HCO3V 24     Recent Labs   Lab 01/25/25  2229   WBC 9.3   HGB 12.6*   HCT 35.1*   MCV 84        Recent Labs   Lab 01/25/25  2229   *   POTASSIUM 3.9   CHLORIDE 80*   CO2 21*   ANIONGAP 10   *   BUN 13.9   CR 0.98   GFRESTIMATED >90   GUERRERO 8.0*   PROTTOTAL 6.8   ALBUMIN 3.9   BILITOTAL 0.5   ALKPHOS 67   AST 63*   ALT 30     Influenza A PCR positive  COVID19 PCR, RSV PCR, influenza B PCR negative      Imaging:  Recent Results (from the past 24 hours)   Chest XR,  PA & LAT    Narrative    EXAM: XR CHEST 2 VIEWS  LOCATION: [facility]  DATE: 1/25/2025    INDICATION: fever  COMPARISON: None.      Impression    IMPRESSION: Abnormal parenchymal opacity in the perihilar right upper lobe and in the left lower lobe suspected to represent bilateral pneumonia. Otherwise negative chest x-ray.       Richard Dunn MD  Hospitalist  [facility]

## 2025-01-27 ENCOUNTER — DOCUMENTATION ONLY (OUTPATIENT)
Dept: OTHER | Facility: CLINIC | Age: 50
End: 2025-01-27
Payer: COMMERCIAL

## 2025-01-27 LAB
ANION GAP SERPL CALCULATED.3IONS-SCNC: 8 MMOL/L (ref 7–15)
BUN SERPL-MCNC: 13 MG/DL (ref 6–20)
CALCIUM SERPL-MCNC: 7.9 MG/DL (ref 8.8–10.4)
CHLORIDE SERPL-SCNC: 97 MMOL/L (ref 98–107)
CREAT SERPL-MCNC: 0.95 MG/DL (ref 0.67–1.17)
EGFRCR SERPLBLD CKD-EPI 2021: >90 ML/MIN/1.73M2
ERYTHROCYTE [DISTWIDTH] IN BLOOD BY AUTOMATED COUNT: 14.2 % (ref 10–15)
GLUCOSE SERPL-MCNC: 146 MG/DL (ref 70–99)
HCO3 SERPL-SCNC: 24 MMOL/L (ref 22–29)
HCT VFR BLD AUTO: 37.4 % (ref 40–53)
HGB BLD-MCNC: 13.1 G/DL (ref 13.3–17.7)
MCH RBC QN AUTO: 30.2 PG (ref 26.5–33)
MCHC RBC AUTO-ENTMCNC: 35 G/DL (ref 31.5–36.5)
MCV RBC AUTO: 86 FL (ref 78–100)
PLATELET # BLD AUTO: 174 10E3/UL (ref 150–450)
POTASSIUM SERPL-SCNC: 4 MMOL/L (ref 3.4–5.3)
RBC # BLD AUTO: 4.34 10E6/UL (ref 4.4–5.9)
SODIUM SERPL-SCNC: 128 MMOL/L (ref 135–145)
SODIUM SERPL-SCNC: 129 MMOL/L (ref 135–145)
SODIUM SERPL-SCNC: 129 MMOL/L (ref 135–145)
WBC # BLD AUTO: 11.8 10E3/UL (ref 4–11)

## 2025-01-27 PROCEDURE — 120N000001 HC R&B MED SURG/OB

## 2025-01-27 PROCEDURE — 250N000013 HC RX MED GY IP 250 OP 250 PS 637: Performed by: INTERNAL MEDICINE

## 2025-01-27 PROCEDURE — 258N000003 HC RX IP 258 OP 636: Performed by: INTERNAL MEDICINE

## 2025-01-27 PROCEDURE — 250N000011 HC RX IP 250 OP 636: Performed by: INTERNAL MEDICINE

## 2025-01-27 PROCEDURE — 84295 ASSAY OF SERUM SODIUM: CPT | Performed by: INTERNAL MEDICINE

## 2025-01-27 PROCEDURE — 36415 COLL VENOUS BLD VENIPUNCTURE: CPT | Performed by: INTERNAL MEDICINE

## 2025-01-27 PROCEDURE — 82310 ASSAY OF CALCIUM: CPT | Performed by: INTERNAL MEDICINE

## 2025-01-27 PROCEDURE — 99232 SBSQ HOSP IP/OBS MODERATE 35: CPT | Performed by: INTERNAL MEDICINE

## 2025-01-27 PROCEDURE — 94640 AIRWAY INHALATION TREATMENT: CPT | Mod: 76

## 2025-01-27 PROCEDURE — 94640 AIRWAY INHALATION TREATMENT: CPT

## 2025-01-27 PROCEDURE — 80048 BASIC METABOLIC PNL TOTAL CA: CPT | Performed by: INTERNAL MEDICINE

## 2025-01-27 PROCEDURE — 85014 HEMATOCRIT: CPT | Performed by: INTERNAL MEDICINE

## 2025-01-27 PROCEDURE — 250N000009 HC RX 250: Performed by: INTERNAL MEDICINE

## 2025-01-27 PROCEDURE — 999N000157 HC STATISTIC RCP TIME EA 10 MIN

## 2025-01-27 RX ORDER — SODIUM CHLORIDE 9 MG/ML
INJECTION, SOLUTION INTRAVENOUS CONTINUOUS
Status: ACTIVE | OUTPATIENT
Start: 2025-01-27 | End: 2025-01-27

## 2025-01-27 RX ADMIN — ALBUTEROL SULFATE 2.5 MG: 2.5 SOLUTION RESPIRATORY (INHALATION) at 21:22

## 2025-01-27 RX ADMIN — ACETAMINOPHEN 1000 MG: 500 TABLET, FILM COATED ORAL at 18:16

## 2025-01-27 RX ADMIN — AZITHROMYCIN DIHYDRATE 250 MG: 250 TABLET ORAL at 20:06

## 2025-01-27 RX ADMIN — SODIUM CHLORIDE: 9 INJECTION, SOLUTION INTRAVENOUS at 14:36

## 2025-01-27 RX ADMIN — MEMANTINE 10 MG: 5 TABLET ORAL at 20:06

## 2025-01-27 RX ADMIN — LACTASE TAB 3000 UNIT 9000 UNITS: 3000 TAB at 09:20

## 2025-01-27 RX ADMIN — ESCITALOPRAM OXALATE 20 MG: 20 TABLET ORAL at 09:19

## 2025-01-27 RX ADMIN — ACETYLCYSTEINE 2 ML: 200 SOLUTION ORAL; RESPIRATORY (INHALATION) at 16:49

## 2025-01-27 RX ADMIN — OSELTAMIVIR PHOSPHATE 75 MG: 75 CAPSULE ORAL at 09:19

## 2025-01-27 RX ADMIN — ACETAMINOPHEN 1000 MG: 500 TABLET, FILM COATED ORAL at 22:10

## 2025-01-27 RX ADMIN — LACTASE TAB 3000 UNIT 9000 UNITS: 3000 TAB at 18:16

## 2025-01-27 RX ADMIN — ACETAMINOPHEN 1000 MG: 500 TABLET, FILM COATED ORAL at 09:18

## 2025-01-27 RX ADMIN — ALBUTEROL SULFATE 2.5 MG: 2.5 SOLUTION RESPIRATORY (INHALATION) at 09:51

## 2025-01-27 RX ADMIN — CALCIUM 500 MG: 500 TABLET ORAL at 18:16

## 2025-01-27 RX ADMIN — CEFTRIAXONE 2 G: 2 INJECTION, POWDER, FOR SOLUTION INTRAMUSCULAR; INTRAVENOUS at 12:59

## 2025-01-27 RX ADMIN — CALCIUM 500 MG: 500 TABLET ORAL at 09:19

## 2025-01-27 RX ADMIN — ACETYLCYSTEINE 2 ML: 200 SOLUTION ORAL; RESPIRATORY (INHALATION) at 09:51

## 2025-01-27 RX ADMIN — ALBUTEROL SULFATE 2.5 MG: 2.5 SOLUTION RESPIRATORY (INHALATION) at 16:49

## 2025-01-27 RX ADMIN — LACTASE TAB 3000 UNIT 9000 UNITS: 3000 TAB at 13:08

## 2025-01-27 RX ADMIN — DEXTROSE MONOHYDRATE: 50 INJECTION, SOLUTION INTRAVENOUS at 00:00

## 2025-01-27 RX ADMIN — CETIRIZINE HYDROCHLORIDE 10 MG: 10 TABLET, FILM COATED ORAL at 09:19

## 2025-01-27 RX ADMIN — DONEPEZIL HYDROCHLORIDE 10 MG: 10 TABLET ORAL at 22:10

## 2025-01-27 RX ADMIN — MEMANTINE 10 MG: 5 TABLET ORAL at 09:19

## 2025-01-27 RX ADMIN — DILTIAZEM HYDROCHLORIDE 240 MG: 240 CAPSULE, EXTENDED RELEASE ORAL at 09:19

## 2025-01-27 RX ADMIN — OSELTAMIVIR PHOSPHATE 75 MG: 75 CAPSULE ORAL at 20:06

## 2025-01-27 RX ADMIN — ACETYLCYSTEINE 2 ML: 200 SOLUTION ORAL; RESPIRATORY (INHALATION) at 21:22

## 2025-01-27 RX ADMIN — ENOXAPARIN SODIUM 40 MG: 40 INJECTION SUBCUTANEOUS at 13:06

## 2025-01-27 ASSESSMENT — ACTIVITIES OF DAILY LIVING (ADL)
ADLS_ACUITY_SCORE: 56
ADLS_ACUITY_SCORE: 74
ADLS_ACUITY_SCORE: 56
ADLS_ACUITY_SCORE: 53
ADLS_ACUITY_SCORE: 74
ADLS_ACUITY_SCORE: 56
ADLS_ACUITY_SCORE: 56
ADLS_ACUITY_SCORE: 74
ADLS_ACUITY_SCORE: 74
ADLS_ACUITY_SCORE: 56
ADLS_ACUITY_SCORE: 53
ADLS_ACUITY_SCORE: 56
ADLS_ACUITY_SCORE: 74

## 2025-01-27 NOTE — PROGRESS NOTES
Care Management Follow Up    Length of Stay (days): 1    Expected Discharge Date: 01/28/2025     Concerns to be Addressed:       Patient plan of care discussed at interdisciplinary rounds: Yes    Anticipated Discharge Disposition:                Anticipated Discharge Services:    Anticipated Discharge DME:      Patient/family educated on Medicare website which has current facility and service quality ratings:    Education Provided on the Discharge Plan:    Patient/Family in Agreement with the Plan:      Referrals Placed by CM/SW:    Private pay costs discussed: Not applicable    Discussed  Partnership in Safe Discharge Planning  document with patient/family: No     Handoff Completed: No, handoff not indicated or clinically appropriate    Additional Information:    SUMAN spoke with Rukhsana [phone] F: [phone] pt GH manager who reports that pts legal guardian is Maxime Gerard P: [phone]. SUMAN emailed [employer] to verify. Rukhsana would like an update once discharge date is known. She can transport the pt home and will need orders faxed prior. She would need to pick the pt up prior to 1600 on day of discharge.     SUMAN spoke with  Maxime Yorkmaxine P: [phone] who confirms that he is pts guardian. He is accepting of pts  providing transport at discharge and will not need orders faxed.     Next Steps: follow for return to .     Yas Paredes/ADI Hong, LGSUMAN  Inpatient Care Coordination  Emergency Room /Float  [phone]    Yas Paredes, SUMAN

## 2025-01-27 NOTE — PLAN OF CARE
ICU END OF SHIFT SUMMARY. FOR VITALS, LABS, AND COMPLETE ASSESSMENT PLEASE SEE FLOWSHEET.     Pertinent assessment:  Alert. Pt hard of hearing. Unable to assess orientation  Wheezing. Breathing room air.  No BM. Urinal at bedside  Pt had a shower, and shaved.  PIV.  Low lactose diet    Discharge/transfer needs: TBD    Bedside shift report complete: Y  Bedside safety check compete: Y   Problem: Adult Inpatient Plan of Care  Goal: Plan of Care Review  Description: The Plan of Care Review/Shift note should be completed every shift.  The Outcome Evaluation is a brief statement about your assessment that the patient is improving, declining, or no change.  This information will be displayed automatically on your shift  note.  Outcome: Progressing  Flowsheets (Taken 1/27/2025 1420)  Outcome Evaluation: see note  Overall Patient Progress: improving  Goal: Absence of Hospital-Acquired Illness or Injury  Intervention: Identify and Manage Fall Risk  Recent Flowsheet Documentation  Taken 1/27/2025 0918 by Dominga Olvera RN  Safety Promotion/Fall Prevention:   activity supervised   clutter free environment maintained   increase visualization of patient   increased rounding and observation   lighting adjusted   mobility aid in reach   nonskid shoes/slippers when out of bed   patient and family education   room near nurse's station   room organization consistent   safety round/check completed   supervised activity   treat reversible contributory factors   treat underlying cause  Intervention: Prevent Skin Injury  Recent Flowsheet Documentation  Taken 1/27/2025 0918 by Dominga Olvera RN  Body Position: position changed independently  Intervention: Prevent and Manage VTE (Venous Thromboembolism) Risk  Recent Flowsheet Documentation  Taken 1/27/2025 0918 by Dominga Olvera RN  VTE Prevention/Management: SCDs off (sequential compression devices)  Goal: Optimal Comfort and Wellbeing  Intervention: Provide Person-Centered  Care  Recent Flowsheet Documentation  Taken 1/27/2025 0918 by Dominga Olvera RN  Trust Relationship/Rapport:   care explained   choices provided   questions encouraged   reassurance provided     Problem: Gas Exchange Impaired  Goal: Optimal Gas Exchange  Intervention: Optimize Oxygenation and Ventilation  Recent Flowsheet Documentation  Taken 1/27/2025 0918 by Dominga Olvera RN  Airway/Ventilation Management:   airway patency maintained   calming measures promoted   pulmonary hygiene promoted  Head of Bed (HOB) Positioning: HOB at 30-45 degrees     Problem: Comorbidity Management  Goal: Blood Pressure in Desired Range  Intervention: Maintain Blood Pressure Management  Recent Flowsheet Documentation  Taken 1/27/2025 0918 by Dominga Olvera RN  Medication Review/Management: medications reviewed     Problem: Pneumonia  Goal: Resolution of Infection Signs and Symptoms  Intervention: Prevent Infection Progression  Recent Flowsheet Documentation  Taken 1/27/2025 0918 by Dominga Olvera RN  Infection Management: aseptic technique maintained  Isolation Precautions: droplet precautions maintained  Goal: Effective Oxygenation and Ventilation  Intervention: Promote Airway Secretion Clearance  Recent Flowsheet Documentation  Taken 1/27/2025 0918 by Dominga Olvera RN  Cough And Deep Breathing: done with encouragement  Activity Management: up in chair  Intervention: Optimize Oxygenation and Ventilation  Recent Flowsheet Documentation  Taken 1/27/2025 0918 by Dominga Olvera RN  Airway/Ventilation Management:   airway patency maintained   calming measures promoted   pulmonary hygiene promoted  Head of Bed (HOB) Positioning: HOB at 30-45 degrees     Goal Outcome Evaluation:           Overall Patient Progress: improvingOverall Patient Progress: improving    Outcome Evaluation: see note

## 2025-01-27 NOTE — PLAN OF CARE
"   ICU End of Shift Summary.  For vital signs and complete assessments, please see documentation flowsheets.      Pertinent assessments:   Neuro: alert, int confusion/disoriented to situation. Denied pain.  Cardiac: VSS  Resp: LS coarse/exp wheezes, on RA.   GI: No BM overnight, Low lactose diet.   : urinal @ bedside  Skin: see flowsheets for details  Lines: PIV      Major Shift Events:   Trending Na q4.     Plan (Upcoming Events): continue poc  Discharge/Transfer Needs: TBD     Bedside Shift Report Completed : Y  Bedside Safety Check Completed: Y           Goal Outcome Evaluation:      Plan of Care Reviewed With: patient    Overall Patient Progress: improvingOverall Patient Progress: improving    Outcome Evaluation: see note      Problem: Adult Inpatient Plan of Care  Goal: Plan of Care Review  Description: The Plan of Care Review/Shift note should be completed every shift.  The Outcome Evaluation is a brief statement about your assessment that the patient is improving, declining, or no change.  This information will be displayed automatically on your shift  note.  Outcome: Progressing  Flowsheets (Taken 1/27/2025 0502)  Outcome Evaluation: see note  Plan of Care Reviewed With: patient  Overall Patient Progress: improving  Goal: Patient-Specific Goal (Individualized)  Description: You can add care plan individualizations to a care plan. Examples of Individualization might be:  \"Parent requests to be called daily at 9am for status\", \"I have a hard time hearing out of my right ear\", or \"Do not touch me to wake me up as it startles  me\".  Outcome: Progressing  Goal: Absence of Hospital-Acquired Illness or Injury  Outcome: Progressing  Intervention: Identify and Manage Fall Risk  Recent Flowsheet Documentation  Taken 1/27/2025 0500 by Sosa Campbell RN  Safety Promotion/Fall Prevention:   activity supervised   clutter free environment maintained   increase visualization of patient   increased rounding and " observation   lighting adjusted   mobility aid in reach   nonskid shoes/slippers when out of bed   patient and family education   room near nurse's station   room organization consistent   safety round/check completed   supervised activity   treat reversible contributory factors   treat underlying cause  Taken 1/26/2025 2045 by Sosa Campbell RN  Safety Promotion/Fall Prevention:   activity supervised   clutter free environment maintained   increase visualization of patient   increased rounding and observation   lighting adjusted   mobility aid in reach   nonskid shoes/slippers when out of bed   patient and family education   room near nurse's station   room organization consistent   safety round/check completed   supervised activity   treat reversible contributory factors   treat underlying cause  Intervention: Prevent Skin Injury  Recent Flowsheet Documentation  Taken 1/27/2025 0400 by Sosa Campbell RN  Body Position: position changed independently  Taken 1/27/2025 0200 by Sosa Campbell RN  Body Position: position changed independently  Taken 1/27/2025 0000 by Sosa Campbell RN  Body Position: position changed independently  Taken 1/26/2025 2045 by Sosa Campbell RN  Body Position: position changed independently  Intervention: Prevent and Manage VTE (Venous Thromboembolism) Risk  Recent Flowsheet Documentation  Taken 1/27/2025 0500 by Sosa Campbell RN  VTE Prevention/Management: SCDs off (sequential compression devices)  Taken 1/26/2025 2045 by Sosa Campbell RN  VTE Prevention/Management: SCDs off (sequential compression devices)  Goal: Optimal Comfort and Wellbeing  Outcome: Progressing  Intervention: Provide Person-Centered Care  Recent Flowsheet Documentation  Taken 1/27/2025 0500 by Sosa Campbell RN  Trust Relationship/Rapport:   care explained   choices provided   questions encouraged   reassurance provided  Taken 1/26/2025 2045 by Sosa Campbell RN  Trust Relationship/Rapport:   care  explained   choices provided   questions encouraged   reassurance provided  Goal: Readiness for Transition of Care  Outcome: Progressing     Problem: Gas Exchange Impaired  Goal: Optimal Gas Exchange  Outcome: Progressing  Intervention: Optimize Oxygenation and Ventilation  Recent Flowsheet Documentation  Taken 1/27/2025 0500 by Sosa Campbell RN  Airway/Ventilation Management:   airway patency maintained   calming measures promoted   pulmonary hygiene promoted  Taken 1/27/2025 0400 by Sosa Campbell RN  Head of Bed (HOB) Positioning: HOB at 30-45 degrees  Taken 1/27/2025 0200 by Sosa Campbell RN  Head of Bed (HOB) Positioning: HOB at 30-45 degrees  Taken 1/27/2025 0000 by Sosa Campbell RN  Head of Bed (HOB) Positioning: HOB at 30-45 degrees  Taken 1/26/2025 2045 by Sosa Campbell RN  Airway/Ventilation Management:   airway patency maintained   calming measures promoted   pulmonary hygiene promoted  Head of Bed (HOB) Positioning: HOB at 30-45 degrees     Problem: Electrolyte Imbalance  Goal: Electrolyte Balance  Outcome: Progressing  Intervention: Monitor and Manage Electrolyte Imbalance  Recent Flowsheet Documentation  Taken 1/27/2025 0500 by Sosa Campbell RN  Fluid/Electrolyte Management: fluids provided  Taken 1/26/2025 2045 by Sosa Campbell RN  Fluid/Electrolyte Management: fluids provided     Problem: Comorbidity Management  Goal: Blood Pressure in Desired Range  Outcome: Progressing  Intervention: Maintain Blood Pressure Management  Recent Flowsheet Documentation  Taken 1/27/2025 0500 by Sosa Campbell RN  Medication Review/Management: medications reviewed  Taken 1/26/2025 2045 by Sosa Campbell RN  Medication Review/Management: medications reviewed     Problem: Pneumonia  Goal: Fluid Balance  Outcome: Progressing  Intervention: Monitor and Manage Fluid Balance  Recent Flowsheet Documentation  Taken 1/27/2025 0500 by Sosa Campbell RN  Fluid/Electrolyte Management: fluids provided  Taken  1/26/2025 2045 by Sosa Campbell RN  Fluid/Electrolyte Management: fluids provided  Goal: Resolution of Infection Signs and Symptoms  Outcome: Progressing  Intervention: Prevent Infection Progression  Recent Flowsheet Documentation  Taken 1/27/2025 0500 by Sosa Campbell RN  Fever Reduction/Comfort Measures: lightweight bedding  Infection Management: aseptic technique maintained  Isolation Precautions: droplet precautions maintained  Taken 1/26/2025 2045 by Sosa Campbell RN  Fever Reduction/Comfort Measures: lightweight bedding  Infection Management: aseptic technique maintained  Isolation Precautions: droplet precautions maintained  Goal: Effective Oxygenation and Ventilation  Outcome: Progressing  Intervention: Promote Airway Secretion Clearance  Recent Flowsheet Documentation  Taken 1/27/2025 0500 by Sosa Campbell RN  Cough And Deep Breathing: done with encouragement  Activity Management: standing at bedside  Taken 1/26/2025 2045 by Sosa Campbell RN  Cough And Deep Breathing: done with encouragement  Activity Management: standing at bedside  Intervention: Optimize Oxygenation and Ventilation  Recent Flowsheet Documentation  Taken 1/27/2025 0500 by Sosa Campbell RN  Airway/Ventilation Management:   airway patency maintained   calming measures promoted   pulmonary hygiene promoted  Taken 1/27/2025 0400 by Sosa Campbell RN  Head of Bed (HOB) Positioning: HOB at 30-45 degrees  Taken 1/27/2025 0200 by Sosa Campbell RN  Head of Bed (HOB) Positioning: HOB at 30-45 degrees  Taken 1/27/2025 0000 by Sosa Campbell RN  Head of Bed (HOB) Positioning: HOB at 30-45 degrees  Taken 1/26/2025 2045 by Sosa Campbell RN  Airway/Ventilation Management:   airway patency maintained   calming measures promoted   pulmonary hygiene promoted  Head of Bed (HOB) Positioning: HOB at 30-45 degrees

## 2025-01-27 NOTE — PROGRESS NOTES
[facility]  Hospitalist Progress Note  Dave Graham M.D., M.B.A.   01/27/2025    Reason for Stay/active problem list    Influenza A infection with acute hypoxic respiratory failure  Possible aspiration pneumonia  Severe hyponatremia         Assessment and Plan:        Summary of Stay: Henry Benson is a 49 year old male with PMH including Down syndrome, dementia?,  Hearing loss, psoriasis, HTN, and mild hyponatremia who presents from his group home for vomiting and fevers.  Patient required ICU admission due to respiratory failure and BiPAP therapy.    Problem List with Assessment and Plan:    Acute hypoxemic respiratory failure  Influenza A infection  Possible aspiration pneumonia:   --Presenting from his group home after vomiting and  fever.  He also reported some diarrhea.  Coughing audibly during my encounter with upper respiratory gurgling audible from outside the room.  Progressive hypoxia in the ER into the low 80s now requiring 15 L by facemask.  Temperature 100.3  F.  Influenza A PCR is positive.  Severe hyponatremia as below.  No leukocytosis.  Chest x-ray does show right upper lobe perihilar and left lower lobe opacities consistent with bilateral pneumonia.  Suspect he may have aspirated and has developed aspiration pneumonitis or aspiration pneumonia.  He received Tamiflu, IV ceftriaxone/azithromycin, 2 DuoNebs, and 10 mg IV dexamethasone in the ER.  Per ER note from 4/2021 history included asthma, but I do not see any documentation of this and he is not wheezing currently.  -ICU admission given progressive respiratory failure and severe hyponatremia  -Patient was treated with multiple medications including Tamiflu, ceftriaxone, azithromycin, Tessalon Perles, Zofran, albuterol inhaler as well as continuous BiPAP.  --Patient's condition improved and he was taken off BiPAP and supplemental oxygen.  His care was downgraded to medical surgical bed.  -Currently is comfortable,  "afebrile, off oxygen.  Comfortable eating.  Continue antibiotic, monitor respiratory status and progress.  His home meds resumed     Severe hyponatremia:   --Initial sodium level low at 111, chloride 80, creatinine 0.98.    -- Patient was treated with normal saline and subsequently with D5W in consultation with nephrology.    -His serum sodium improved to 129 today, continue to monitor labs.  IV fluid discontinued.    Down syndrome  Dementia  Hearing loss: Lives in a group home setting.  He is on Namenda 10 mg twice daily and donepezil 10 mg at bedtime a presumably also has early onset dementia which is common in Down syndrome.  Multiple times removed his supplemental oxygen in the ER, but when someone is present he is cooperative and calm.  -Resumed Namenda and donepezil      HTN: Resumed PTA diltiazem  mg daily.     Lactose intolerant: When allowed diet recommend no lactose diet or providing Lactaid.          VTE Prophylaxis: Enoxaparin (Lovenox) SQ  Code Status: Full Code  Diet: Low Lactose Diet    Arango Catheter: Not present          Plan for today:  May transfer to the floor      Anticipated Disposition :      Family update: Unable to reach his guardian Kari    Medically Ready for Discharge: Anticipated in 2-4 Days    : Pending clinical progress               Interval History (Subjective):        Patient is seen and examined by me today and medical record reviewed.Overnight events noted and care discussed with nursing staff.  Patient continues to do well.  He denies any complaints.  He is hard of hearing.  Otherwise very pleasant and cooperative.  Care plan discussed with bedside nurse and rounding team.       Physical Exam:        Last Vital Signs:  /88 (BP Location: Left arm)   Pulse 63   Temp 98.8  F (37.1  C) (Oral)   Resp 14   Ht 1.702 m (5' 7\")   Wt 78.7 kg (173 lb 8 oz)   SpO2 96%   BMI 27.17 kg/m      I/O last 3 completed shifts:  In: 2538.34 [P.O.:450; I.V.:2088.34]  Out: 6500 " "[Urine:6500]    Wt Readings from Last 5 Encounters:   01/25/25 78.7 kg (173 lb 8 oz)   02/26/15 79.4 kg (175 lb)        Constitutional: Awake, alert, cooperative, no apparent distress     Respiratory: Clear to auscultation bilaterally, no crackles or wheezing   Cardiovascular: Regular rate and rhythm, normal S1 and S2, and no murmur noted   Abdomen: Normal bowel sounds, soft, non-distended, non-tender   Skin: No new rashes, no cyanosis, dry to touch   Neuro: Alert with  no new focal weakness   Extremities: No edema   Other(s):        All other systems: Negative          Medications:        All current medications were reviewed with changes reflected in problem list.         Data:      All new lab and imaging data was reviewed.      Data reviewed today: I reviewed all new labs and imaging results over the last 24 hours. I personally reviewed       Recent Labs   Lab 01/27/25  0544 01/26/25  0536 01/25/25  2229   WBC 11.8* 7.9 9.3   HGB 13.1* 12.2* 12.6*   HCT 37.4* 34.0* 35.1*   MCV 86 84 84    153 178     No results for input(s): \"CULT\" in the last 168 hours.  Recent Labs   Lab 01/27/25  0544 01/27/25  0201 01/26/25  2212 01/26/25  1819 01/26/25  1544 01/26/25  1337 01/26/25  1206 01/26/25  0821 01/26/25  0536 01/26/25  0254 01/25/25  2229   *  129* 128* 128*   < >  --    < >  --    < > 116*  116*   < > 111*   POTASSIUM 4.0  --   --   --   --   --   --   --  3.6  --  3.9   CHLORIDE 97*  --   --   --   --   --   --   --  85*  --  80*   CO2 24  --   --   --   --   --   --   --  21*  --  21*   ANIONGAP 8  --   --   --   --   --   --   --  10  --  10   *  --   --   --  145*  --  189*   < > 146*  --  102*   BUN 13.0  --   --   --   --   --   --   --  14.3  --  13.9   CR 0.95  --   --   --   --   --   --   --  1.01  --  0.98   GFRESTIMATED >90  --   --   --   --   --   --   --  >90  --  >90   GUERRERO 7.9*  --   --   --   --   --   --   --  7.9*  --  8.0*   PROTTOTAL  --   --   --   --   --   --   --   --  " " --   --  6.8   ALBUMIN  --   --   --   --   --   --   --   --   --   --  3.9   BILITOTAL  --   --   --   --   --   --   --   --   --   --  0.5   ALKPHOS  --   --   --   --   --   --   --   --   --   --  67   AST  --   --   --   --   --   --   --   --   --   --  63*   ALT  --   --   --   --   --   --   --   --   --   --  30    < > = values in this interval not displayed.       Recent Labs   Lab 01/27/25  0544 01/26/25  1544 01/26/25  1206 01/26/25  0821 01/26/25  0536   * 145* 189* 169* 146*       No results for input(s): \"INR\" in the last 168 hours.      No results for input(s): \"TROPONIN\", \"TROPI\", \"TROPR\" in the last 168 hours.    Invalid input(s): \"TROP\", \"TROPONINIES\"    Recent Results (from the past 48 hours)   Chest XR,  PA & LAT    Narrative    EXAM: XR CHEST 2 VIEWS  LOCATION: [facility]  DATE: 1/25/2025    INDICATION: fever  COMPARISON: None.      Impression    IMPRESSION: Abnormal parenchymal opacity in the perihilar right upper lobe and in the left lower lobe suspected to represent bilateral pneumonia. Otherwise negative chest x-ray.       COVID Status:  COVID-19 PCR Results          1/25/2025    22:29   COVID-19 PCR Results   SARS CoV2 PCR Negative      COVID-19 Antibody Results, Testing for Immunity           No data to display                 Disclaimer: This note consists of symbols derived from keyboarding, dictation and/or voice recognition software. As a result, there may be errors in the script that have gone undetected. Please consider this when interpreting information found in this chart.    "

## 2025-01-27 NOTE — PROGRESS NOTES
Assessment and Plan:     Hyponatremia: chronic mild hyponatremia, adm with severe decrease in Na. Responded well to NS.   He is on CaCO3 po, taking diet po. No IVF.     Na: 111 on adm 1/25, 129 today. Re-check in am. Not on IVF now. BP somewhat low. Will give 500 bolus of NS.             Interval History:   Down's syndrome: influenza A, pneumonia. Group home resident.  He was started on oseltamivir + azithro + ceftriaxone + dexamethasone.       HT: on Dilt ER.               Review of Systems:   Alert and cooperative. No complaints.           Medications:     Current Facility-Administered Medications   Medication Dose Route Frequency Provider Last Rate Last Admin    acetaminophen (TYLENOL) tablet 1,000 mg  1,000 mg Oral TID Dave Graham MD   1,000 mg at 01/27/25 0918    acetylcysteine (MUCOMYST) 20 % nebulizer solution 2 mL  2 mL Nebulization TID Richard Dunn MD   2 mL at 01/27/25 0951    azithromycin (ZITHROMAX) tablet 250 mg  250 mg Oral QPM Richard Dunn MD   250 mg at 01/26/25 2029    calcium carbonate 500 mg (elemental) (OSCAL) tablet 500 mg  1 tablet Oral BID Dave Graham MD   500 mg at 01/27/25 0919    cefTRIAXone (ROCEPHIN) 2 g vial to attach to  ml bag for ADULTS or NS 50 ml bag for PEDS  2 g Intravenous Q24H Richard Dunn MD   2 g at 01/27/25 1259    cetirizine (zyrTEC) tablet 10 mg  10 mg Oral Daily Richard Dunn MD   10 mg at 01/27/25 0919    diltiazem ER COATED BEADS (CARDIZEM CD/CARTIA XT) 24 hr capsule 240 mg  240 mg Oral Daily Richard Dunn MD   240 mg at 01/27/25 0919    donepezil (ARICEPT) tablet 10 mg  10 mg Oral At Bedtime Richard Dunn MD   10 mg at 01/26/25 2359    enoxaparin ANTICOAGULANT (LOVENOX) injection 40 mg  40 mg Subcutaneous Q24H Dave Graham MD   40 mg at 01/27/25 1306    escitalopram (LEXAPRO) tablet 20 mg  20 mg Oral Daily Dave Graham MD   20 mg at 01/27/25 0919    fluticasone (FLONASE) 50  MCG/ACT spray 1 spray  1 spray Both Nostrils Daily Richard Dunn MD   1 spray at 25 1007    lactase (LACTAID) tablet 9,000-27,000 Units  9,000-27,000 Units Oral TID w/meals Dave Graham MD   9,000 Units at 25 1308    memantine (NAMENDA) tablet 10 mg  10 mg Oral BID Richard Dunn MD   10 mg at 25 0919    oseltamivir (TAMIFLU) capsule 75 mg  75 mg Oral BID Richard Dunn MD   75 mg at 25 0919    sodium chloride (PF) 0.9% PF flush 3 mL  3 mL Intracatheter Q8H Richard Dunn MD   3 mL at 25 0933     Current Facility-Administered Medications   Medication Dose Route Frequency Provider Last Rate Last Admin    No lozenges or gum should be given while patient on BIPAP/AVAPS/AVAPS AE   Does not apply Continuous PRN Sami Clements MD         Current active medications and PTA medications reviewed, see medication list for details.            Physical Exam:   Vitals were reviewed  Patient Vitals for the past 24 hrs:   BP Temp Temp src Pulse Resp SpO2   25 0951 -- -- -- 63 14 96 %   25 0918 103/88 98.8  F (37.1  C) Oral 64 -- 94 %   25 0600 -- -- -- -- 16 93 %   25 0500 -- 98.2  F (36.8  C) Temporal -- 12 95 %   25 0000 -- -- -- -- -- 93 %   25 2045 99/56 99  F (37.2  C) Temporal -- 16 98 %   25 2031 -- -- -- 63 14 98 %   25 1724 108/67 -- -- -- -- --   25 1700 -- 99.2  F (37.3  C) Oral -- -- --   25 1547 -- -- -- 58 16 95 %       Temp:  [98.2  F (36.8  C)-99.2  F (37.3  C)] 98.8  F (37.1  C)  Pulse:  [58-64] 63  Resp:  [12-16] 14  BP: ()/(56-88) 103/88  SpO2:  [93 %-98 %] 96 %    Temperatures:  Current - Temp: 98.8  F (37.1  C); Max - Temp  Av.8  F (37.1  C)  Min: 98.2  F (36.8  C)  Max: 99.2  F (37.3  C)  Respiration range: Resp  Av.7  Min: 12  Max: 16  Pulse range: Pulse  Av  Min: 58  Max: 64  Blood pressure range: Systolic (24hrs), Av , Min:99 , Max:108   ;  "Diastolic (24hrs), Av, Min:56, Max:88    Pulse oximetry range: SpO2  Av.3 %  Min: 93 %  Max: 98 %    I/O last 3 completed shifts:  In: 2538.34 [P.O.:450; I.V.:8.34]  Out: 6500 [Urine:6500]      Intake/Output Summary (Last 24 hours) at 2025 1347  Last data filed at 2025 1347  Gross per 24 hour   Intake 1921.67 ml   Output 6150 ml   Net -4228.33 ml     Sitting up in bed eating lunch.   Not on O2  Lungs with clear BS  Cor RRR nl S1 S2 no M  LE no edema       Wt Readings from Last 4 Encounters:   25 78.7 kg (173 lb 8 oz)   02/26/15 79.4 kg (175 lb)          Data:          Lab Results   Component Value Date     2025     2025     2025     2021    Lab Results   Component Value Date    CHLORIDE 97 2025    CHLORIDE 85 2025    CHLORIDE 80 2025    CHLORIDE 98 2021    Lab Results   Component Value Date    BUN 13.0 2025    BUN 14.3 2025    BUN 13.9 2025    BUN 11 2021      Lab Results   Component Value Date    POTASSIUM 4.0 2025    POTASSIUM 3.6 2025    POTASSIUM 3.9 2025    POTASSIUM 4.1 2021    Lab Results   Component Value Date    CO2 24 2025    CO2 21 2025    CO2 21 2025    CO2 25 2021    Lab Results   Component Value Date    CR 0.95 2025    CR 1.01 2025    CR 0.98 2025    CR 1.04 2021        Recent Labs   Lab Test 25  0544 25  0536 25  2229   WBC 11.8* 7.9 9.3   HGB 13.1* 12.2* 12.6*   HCT 37.4* 34.0* 35.1*   MCV 86 84 84    153 178     Recent Labs   Lab Test 25  2229 24  1615 21  1147   AST 63* 31 26   ALT 30 26 38   ALKPHOS 67 84 84   BILITOTAL 0.5 0.5 0.5       No results for input(s): \"MAG\" in the last 83280 hours.  No results for input(s): \"PHOS\" in the last 20609 hours.  Recent Labs   Lab Test 25  0544 25  0536 25   GUERRERO 7.9* 7.9* 8.0*       Lab Results " "  Component Value Date    GUERRERO 7.9 (L) 01/27/2025     Lab Results   Component Value Date    WBC 11.8 (H) 01/27/2025    HGB 13.1 (L) 01/27/2025    HCT 37.4 (L) 01/27/2025    MCV 86 01/27/2025     01/27/2025     Lab Results   Component Value Date     (L) 01/27/2025     (L) 01/27/2025    POTASSIUM 4.0 01/27/2025    CHLORIDE 97 (L) 01/27/2025    CO2 24 01/27/2025     (H) 01/27/2025     Lab Results   Component Value Date    BUN 13.0 01/27/2025    CR 0.95 01/27/2025     No results found for: \"MAG\"  No results found for: \"PHOS\"    Creatinine   Date Value Ref Range Status   01/27/2025 0.95 0.67 - 1.17 mg/dL Final   01/26/2025 1.01 0.67 - 1.17 mg/dL Final   01/25/2025 0.98 0.67 - 1.17 mg/dL Final   06/13/2024 1.04 0.67 - 1.17 mg/dL Final   04/19/2021 1.04 0.66 - 1.25 mg/dL Final       Attestation:  I have reviewed today's vital signs, notes, medications, labs and imaging.     Chavez Gentile MD            "

## 2025-01-28 LAB
ANION GAP SERPL CALCULATED.3IONS-SCNC: 10 MMOL/L (ref 7–15)
BUN SERPL-MCNC: 11.7 MG/DL (ref 6–20)
CALCIUM SERPL-MCNC: 8.2 MG/DL (ref 8.8–10.4)
CHLORIDE SERPL-SCNC: 97 MMOL/L (ref 98–107)
CREAT SERPL-MCNC: 0.92 MG/DL (ref 0.67–1.17)
EGFRCR SERPLBLD CKD-EPI 2021: >90 ML/MIN/1.73M2
ERYTHROCYTE [DISTWIDTH] IN BLOOD BY AUTOMATED COUNT: 14.6 % (ref 10–15)
GLUCOSE SERPL-MCNC: 90 MG/DL (ref 70–99)
HCO3 SERPL-SCNC: 23 MMOL/L (ref 22–29)
HCT VFR BLD AUTO: 37.4 % (ref 40–53)
HGB BLD-MCNC: 13 G/DL (ref 13.3–17.7)
MCH RBC QN AUTO: 30.2 PG (ref 26.5–33)
MCHC RBC AUTO-ENTMCNC: 34.8 G/DL (ref 31.5–36.5)
MCV RBC AUTO: 87 FL (ref 78–100)
PLATELET # BLD AUTO: 184 10E3/UL (ref 150–450)
POTASSIUM SERPL-SCNC: 4.1 MMOL/L (ref 3.4–5.3)
RBC # BLD AUTO: 4.31 10E6/UL (ref 4.4–5.9)
SODIUM SERPL-SCNC: 130 MMOL/L (ref 135–145)
WBC # BLD AUTO: 7 10E3/UL (ref 4–11)

## 2025-01-28 PROCEDURE — 250N000013 HC RX MED GY IP 250 OP 250 PS 637: Performed by: INTERNAL MEDICINE

## 2025-01-28 PROCEDURE — 80048 BASIC METABOLIC PNL TOTAL CA: CPT | Performed by: INTERNAL MEDICINE

## 2025-01-28 PROCEDURE — 250N000011 HC RX IP 250 OP 636: Performed by: INTERNAL MEDICINE

## 2025-01-28 PROCEDURE — 82565 ASSAY OF CREATININE: CPT | Performed by: INTERNAL MEDICINE

## 2025-01-28 PROCEDURE — 250N000009 HC RX 250: Performed by: INTERNAL MEDICINE

## 2025-01-28 PROCEDURE — 82310 ASSAY OF CALCIUM: CPT | Performed by: INTERNAL MEDICINE

## 2025-01-28 PROCEDURE — 85041 AUTOMATED RBC COUNT: CPT | Performed by: INTERNAL MEDICINE

## 2025-01-28 PROCEDURE — 99232 SBSQ HOSP IP/OBS MODERATE 35: CPT | Performed by: INTERNAL MEDICINE

## 2025-01-28 PROCEDURE — 94640 AIRWAY INHALATION TREATMENT: CPT

## 2025-01-28 PROCEDURE — 36415 COLL VENOUS BLD VENIPUNCTURE: CPT | Performed by: INTERNAL MEDICINE

## 2025-01-28 PROCEDURE — 85018 HEMOGLOBIN: CPT | Performed by: INTERNAL MEDICINE

## 2025-01-28 PROCEDURE — 94640 AIRWAY INHALATION TREATMENT: CPT | Mod: 76

## 2025-01-28 PROCEDURE — 120N000001 HC R&B MED SURG/OB

## 2025-01-28 PROCEDURE — 999N000157 HC STATISTIC RCP TIME EA 10 MIN

## 2025-01-28 RX ORDER — GUAIFENESIN 600 MG/1
600 TABLET, EXTENDED RELEASE ORAL 2 TIMES DAILY
Status: DISCONTINUED | OUTPATIENT
Start: 2025-01-28 | End: 2025-01-29 | Stop reason: HOSPADM

## 2025-01-28 RX ORDER — DILTIAZEM HYDROCHLORIDE 180 MG/1
180 CAPSULE, COATED, EXTENDED RELEASE ORAL DAILY
Status: DISCONTINUED | OUTPATIENT
Start: 2025-01-28 | End: 2025-01-29 | Stop reason: HOSPADM

## 2025-01-28 RX ORDER — CEFDINIR 300 MG/1
300 CAPSULE ORAL EVERY 12 HOURS SCHEDULED
Status: DISCONTINUED | OUTPATIENT
Start: 2025-01-28 | End: 2025-01-29 | Stop reason: HOSPADM

## 2025-01-28 RX ADMIN — GUAIFENESIN 600 MG: 600 TABLET ORAL at 21:10

## 2025-01-28 RX ADMIN — ALBUTEROL SULFATE 2.5 MG: 2.5 SOLUTION RESPIRATORY (INHALATION) at 15:25

## 2025-01-28 RX ADMIN — ACETAMINOPHEN 1000 MG: 500 TABLET, FILM COATED ORAL at 21:15

## 2025-01-28 RX ADMIN — OSELTAMIVIR PHOSPHATE 75 MG: 75 CAPSULE ORAL at 21:10

## 2025-01-28 RX ADMIN — MEMANTINE 10 MG: 5 TABLET ORAL at 09:15

## 2025-01-28 RX ADMIN — ENOXAPARIN SODIUM 40 MG: 40 INJECTION SUBCUTANEOUS at 14:48

## 2025-01-28 RX ADMIN — LACTASE TAB 3000 UNIT 9000 UNITS: 3000 TAB at 12:44

## 2025-01-28 RX ADMIN — ACETYLCYSTEINE 2 ML: 200 SOLUTION ORAL; RESPIRATORY (INHALATION) at 08:43

## 2025-01-28 RX ADMIN — LACTASE TAB 3000 UNIT 9000 UNITS: 3000 TAB at 09:54

## 2025-01-28 RX ADMIN — CEFDINIR 300 MG: 300 CAPSULE ORAL at 09:17

## 2025-01-28 RX ADMIN — DONEPEZIL HYDROCHLORIDE 10 MG: 10 TABLET ORAL at 21:15

## 2025-01-28 RX ADMIN — MEMANTINE 10 MG: 5 TABLET ORAL at 21:09

## 2025-01-28 RX ADMIN — GUAIFENESIN 600 MG: 600 TABLET ORAL at 09:16

## 2025-01-28 RX ADMIN — ACETYLCYSTEINE 2 ML: 200 SOLUTION ORAL; RESPIRATORY (INHALATION) at 20:25

## 2025-01-28 RX ADMIN — LACTASE TAB 3000 UNIT 9000 UNITS: 3000 TAB at 18:02

## 2025-01-28 RX ADMIN — CALCIUM 500 MG: 500 TABLET ORAL at 16:03

## 2025-01-28 RX ADMIN — DILTIAZEM HYDROCHLORIDE 180 MG: 180 CAPSULE, COATED, EXTENDED RELEASE ORAL at 09:15

## 2025-01-28 RX ADMIN — ACETYLCYSTEINE 2 ML: 200 SOLUTION ORAL; RESPIRATORY (INHALATION) at 15:25

## 2025-01-28 RX ADMIN — CALCIUM 500 MG: 500 TABLET ORAL at 09:16

## 2025-01-28 RX ADMIN — ALBUTEROL SULFATE 2.5 MG: 2.5 SOLUTION RESPIRATORY (INHALATION) at 20:25

## 2025-01-28 RX ADMIN — FLUTICASONE PROPIONATE 1 SPRAY: 50 SPRAY, METERED NASAL at 09:17

## 2025-01-28 RX ADMIN — CETIRIZINE HYDROCHLORIDE 10 MG: 10 TABLET, FILM COATED ORAL at 09:15

## 2025-01-28 RX ADMIN — ACETAMINOPHEN 1000 MG: 500 TABLET, FILM COATED ORAL at 16:03

## 2025-01-28 RX ADMIN — AZITHROMYCIN DIHYDRATE 250 MG: 250 TABLET ORAL at 21:10

## 2025-01-28 RX ADMIN — ESCITALOPRAM OXALATE 20 MG: 20 TABLET ORAL at 09:16

## 2025-01-28 RX ADMIN — ACETAMINOPHEN 1000 MG: 500 TABLET, FILM COATED ORAL at 09:15

## 2025-01-28 RX ADMIN — CEFDINIR 300 MG: 300 CAPSULE ORAL at 21:10

## 2025-01-28 RX ADMIN — ALBUTEROL SULFATE 2.5 MG: 2.5 SOLUTION RESPIRATORY (INHALATION) at 08:43

## 2025-01-28 RX ADMIN — OSELTAMIVIR PHOSPHATE 75 MG: 75 CAPSULE ORAL at 09:16

## 2025-01-28 ASSESSMENT — ACTIVITIES OF DAILY LIVING (ADL)
ADLS_ACUITY_SCORE: 53

## 2025-01-28 NOTE — PLAN OF CARE
"Orientation:A/O x3, doesn't know town but knows [place] and he's in a hospital  VS:/76 (BP Location: Left arm)   Pulse 68   Temp 98.1  F (36.7  C) (Axillary)   Resp 17   Ht 1.702 m (5' 7\")   Wt 78.7 kg (173 lb 8 oz)   SpO2 97%   BMI 27.17 kg/m    LS: coarse on L side, dim on left  GI:tolerating lactose reduced diet  : voiding into urinal at bedside  Skin: deep cracks in dry skin of fingers and feet, cleaned, lotion applied  Activity: SBA  Pain: denies, on scheduled tylenol  Lines: PIV, SL    Problem: Adult Inpatient Plan of Care  Goal: Plan of Care Review  Description: The Plan of Care Review/Shift note should be completed every shift.  The Outcome Evaluation is a brief statement about your assessment that the patient is improving, declining, or no change.  This information will be displayed automatically on your shift  note.  Outcome: Progressing  Flowsheets (Taken 1/27/2025 2152)  Outcome Evaluation: see note  Plan of Care Reviewed With: patient  Overall Patient Progress: improving  Goal: Absence of Hospital-Acquired Illness or Injury  Intervention: Identify and Manage Fall Risk  Recent Flowsheet Documentation  Taken 1/27/2025 2000 by Kelsie Hawthorne RN  Safety Promotion/Fall Prevention:   activity supervised   treat underlying cause   treat reversible contributory factors   nonskid shoes/slippers when out of bed   increase visualization of patient  Intervention: Prevent Skin Injury  Recent Flowsheet Documentation  Taken 1/27/2025 2008 by Kelsie Hawthorne RN  Body Position: (stood at bedside to void)   position changed independently   sitting up in bed   weight shifting  Intervention: Prevent and Manage VTE (Venous Thromboembolism) Risk  Recent Flowsheet Documentation  Taken 1/27/2025 2000 by Kelsie Hawthorne RN  VTE Prevention/Management: (lovenox) SCDs off (sequential compression devices)  Taken 1/27/2025 1700 by Kelsie Hawthorne RN  VTE Prevention/Management: (lovenox) SCDs off " (sequential compression devices)  Goal: Optimal Comfort and Wellbeing  Intervention: Provide Person-Centered Care  Recent Flowsheet Documentation  Taken 1/27/2025 2000 by Kelsie Hawthorne RN  Trust Relationship/Rapport:   care explained   emotional support provided     Problem: Gas Exchange Impaired  Goal: Optimal Gas Exchange  Intervention: Optimize Oxygenation and Ventilation  Recent Flowsheet Documentation  Taken 1/27/2025 2008 by Kelsie Hawthorne RN  Head of Bed (HOB) Positioning: HOB at 60 degrees  Taken 1/27/2025 2000 by Kelsie Hawthorne RN  Airway/Ventilation Management: calming measures promoted     Problem: Electrolyte Imbalance  Goal: Electrolyte Balance  Intervention: Monitor and Manage Electrolyte Imbalance  Recent Flowsheet Documentation  Taken 1/27/2025 2000 by Kelsie Hawthorne RN  Fluid/Electrolyte Management: fluids provided     Problem: Comorbidity Management  Goal: Blood Pressure in Desired Range  Intervention: Maintain Blood Pressure Management  Recent Flowsheet Documentation  Taken 1/27/2025 2000 by Kelsie Hawthorne RN  Medication Review/Management: medications reviewed     Problem: Pneumonia  Goal: Fluid Balance  Intervention: Monitor and Manage Fluid Balance  Recent Flowsheet Documentation  Taken 1/27/2025 2000 by Kelsie Hawthorne RN  Fluid/Electrolyte Management: fluids provided  Goal: Resolution of Infection Signs and Symptoms  Intervention: Prevent Infection Progression  Recent Flowsheet Documentation  Taken 1/27/2025 2000 by Kelsie Hawthorne RN  Infection Management: aseptic technique maintained  Isolation Precautions: droplet precautions maintained  Goal: Effective Oxygenation and Ventilation  Intervention: Promote Airway Secretion Clearance  Recent Flowsheet Documentation  Taken 1/27/2025 2000 by Kelsie Hawthorne RN  Cough And Deep Breathing: done with encouragement  Activity Management: up ad kayla  Intervention: Optimize Oxygenation and  Ventilation  Recent Flowsheet Documentation  Taken 1/27/2025 2008 by Kelsie Hawthorne, RN  Head of Bed (HOB) Positioning: HOB at 60 degrees  Taken 1/27/2025 2000 by Kelsie Hawthorne, RN  Airway/Ventilation Management: calming measures promoted   Goal Outcome Evaluation:      Plan of Care Reviewed With: patient    Overall Patient Progress: improvingOverall Patient Progress: improving    Outcome Evaluation: see note

## 2025-01-28 NOTE — PLAN OF CARE
"Temp: 99  F (37.2  C) Temp src: Oral BP: 109/69 Pulse: 67   Resp: 16 SpO2: 92 % O2 Device: None (Room air)       A&Ox3-4, knows we are at a hospital in the \"[facility]\", that he is being treated for pneumonia, and it is January of 2025. Up SBA, bed alarm on for safety although has not attempted to get up on own. VSS, placed 1L NC later in shift for O2 88% on RA. Remains on seizure precautions. No tele verified per usha PELAEZ. Needs reminders to void. Hard of hearing, bilateral HA on bedside table. Plan to continue nebulizers, PO tamiflu, IV rocephin, PO zithromax. Discharge back to detention when medically ready.    Goal Outcome Evaluation:      Plan of Care Reviewed With: patient    Overall Patient Progress: improvingOverall Patient Progress: improving    Outcome Evaluation: RA, nebs, PO tamiflu, IV rocephin, PO zithromax.      Problem: Adult Inpatient Plan of Care  Goal: Plan of Care Review  Description: The Plan of Care Review/Shift note should be completed every shift.  The Outcome Evaluation is a brief statement about your assessment that the patient is improving, declining, or no change.  This information will be displayed automatically on your shift  note.  Outcome: Progressing  Flowsheets (Taken 1/28/2025 0422)  Outcome Evaluation: RA, nebs, PO tamiflu, IV rocephin, PO zithromax.  Plan of Care Reviewed With: patient  Overall Patient Progress: improving  Goal: Patient-Specific Goal (Individualized)  Description: You can add care plan individualizations to a care plan. Examples of Individualization might be:  \"Parent requests to be called daily at 9am for status\", \"I have a hard time hearing out of my right ear\", or \"Do not touch me to wake me up as it startles  me\".  Outcome: Progressing  Goal: Absence of Hospital-Acquired Illness or Injury  Outcome: Progressing  Intervention: Identify and Manage Fall Risk  Recent Flowsheet Documentation  Taken 1/27/2025 4201 by Daphney Barrios, RN  Safety Promotion/Fall " Prevention:   activity supervised   room near nurse's station   room organization consistent   safety round/check completed  Intervention: Prevent Skin Injury  Recent Flowsheet Documentation  Taken 1/28/2025 0424 by Daphney Barrios RN  Body Position: position changed independently  Taken 1/27/2025 2341 by Daphney Barrios RN  Body Position: position changed independently  Skin Protection:   adhesive use limited   incontinence pads utilized   transparent dressing maintained   tubing/devices free from skin contact  Intervention: Prevent and Manage VTE (Venous Thromboembolism) Risk  Recent Flowsheet Documentation  Taken 1/27/2025 2341 by Daphney Barrios RN  VTE Prevention/Management: (lovenox) SCDs off (sequential compression devices)  Intervention: Prevent Infection  Recent Flowsheet Documentation  Taken 1/27/2025 2341 by Daphney Barrios RN  Infection Prevention:   rest/sleep promoted   single patient room provided  Goal: Optimal Comfort and Wellbeing  Outcome: Progressing  Intervention: Provide Person-Centered Care  Recent Flowsheet Documentation  Taken 1/27/2025 2341 by Daphney Barrios RN  Trust Relationship/Rapport:   care explained   emotional support provided  Goal: Readiness for Transition of Care  Outcome: Progressing     Problem: Gas Exchange Impaired  Goal: Optimal Gas Exchange  Outcome: Progressing  Intervention: Optimize Oxygenation and Ventilation  Recent Flowsheet Documentation  Taken 1/28/2025 0424 by Daphney Barrios RN  Head of Bed (HOB) Positioning: HOB at 20-30 degrees  Taken 1/27/2025 2341 by Daphney Barrios RN  Head of Bed (HOB) Positioning: HOB at 20-30 degrees     Problem: Electrolyte Imbalance  Goal: Electrolyte Balance  Outcome: Progressing     Problem: Comorbidity Management  Goal: Blood Pressure in Desired Range  Outcome: Progressing  Intervention: Maintain Blood Pressure Management  Recent Flowsheet Documentation  Taken 1/27/2025 2341 by Daphney Barrios RN  Medication Review/Management:  medications reviewed     Problem: Pneumonia  Goal: Fluid Balance  Outcome: Progressing  Goal: Resolution of Infection Signs and Symptoms  Outcome: Progressing  Intervention: Prevent Infection Progression  Recent Flowsheet Documentation  Taken 1/27/2025 2341 by Daphney Barrios RN  Isolation Precautions: droplet precautions maintained  Goal: Effective Oxygenation and Ventilation  Outcome: Progressing  Intervention: Promote Airway Secretion Clearance  Recent Flowsheet Documentation  Taken 1/27/2025 2341 by Daphney Barrios RN  Cough And Deep Breathing: done with encouragement  Activity Management: bedrest  Intervention: Optimize Oxygenation and Ventilation  Recent Flowsheet Documentation  Taken 1/28/2025 0424 by Daphney Barrios, RN  Head of Bed (HOB) Positioning: HOB at 20-30 degrees  Taken 1/27/2025 2341 by Daphney Barrios RN  Head of Bed (HOB) Positioning: HOB at 20-30 degrees

## 2025-01-28 NOTE — PROGRESS NOTES
[facility]  Hospitalist Progress Note  John Woo MD   01/28/2025    Reason for Stay/active problem list    Influenza A infection with acute hypoxic respiratory failure  Possible aspiration pneumonia  Severe hyponatremia         Assessment and Plan:        Summary of Stay: Henry Benson is a 49 year old male with PMH including Down syndrome living in a group home, dementia,  Hearing loss, psoriasis, hypertension, and mild chronic hyponatremia who presented from his group home for vomiting and fevers. Found to have hypoxic respiratory failure related to the flu and possible bacterial pneumonia.       Problem List with Assessment and Plan:    Acute hypoxemic respiratory failure, Influenza A infection, Possible bacterial pneumonia:   --Presented from his group home after vomiting and  fever.  He also reported some diarrhea.  Coughing audibly during my encounter with upper respiratory gurgling audible from outside the room.  Progressive hypoxia in the ER into the low 80s required 15 L by facemask then bippa.  Temperature 100.3  F.  Influenza A PCR is positive.  Severe hyponatremia as below.  No leukocytosis.  Chest x-ray does show right upper lobe perihilar and left lower lobe opacities consistent with bilateral pneumonia.  Suspect he may have aspirated and has developed aspiration pneumonitis or aspiration pneumonia.  He received Tamiflu, IV ceftriaxone/azithromycin, 2 DuoNebs, and 10 mg IV dexamethasone in the ER.  Per ER note from 4/2021 history included asthma   -ICU admission given progressive respiratory failure and severe hyponatremia with bipap needed  -Patient was treated with multiple medications including Tamiflu, ceftriaxone, azithromycin, Tessalon Perles, Zofran, albuterol inhaler as well as continuous BiPAP. Has improved to wean down his oxygen  --Patient's condition improved and he was taken off BiPAP and supplemental oxygen.  His care was downgraded to medical surgical  "bed.  -Currently is comfortable, afebrile, was able to be  off oxygen yesterday but overnight dropped to the 80s on room air with sleeping so was placed back on oxygen.  Wean as able   -transition IV rocephin to oral omnicef and continue on azithromycin and add mucinex.       Severe hyponatremia:   --Initial sodium level low at 111, chloride 80, creatinine 0.98.    -- Patient was treated with normal saline and subsequently with D5W in consultation with nephrology.    -His serum sodium improved to 129 1/27/25, continue to monitor labs.  IV fluid discontinued.  -monitor labs    Down syndrome, Dementia, Hearing loss:   Lives in a group home setting.  He is on Namenda and donepezil, presumably also has early onset dementia which is common in Down syndrome.   Continue home medications       Hypertension :   Resumed PTA diltiazem ER but blood pressure on the lower side so will cut his diltiazem dose     Lactose intolerant:   When allowed diet recommend no lactose diet or providing Lactaid.      VTE Prophylaxis: Enoxaparin (Lovenox) SQ  Code Status: Full Code  Diet: Low Lactose Diet    Arango Catheter: Not present          Medically Ready for Discharge: Anticipated Tomorrow    Discussed with nursing, social work/case management, tried to update guardian but out of the office          Interval History (Subjective):      Patient is new to me.  Patient is feeling better, denies sob, does have a cough, no fever.  Did get put back o oxygen overnight for sats down to the 80s while sleeping.  No other new complaints.   Giving me the thumbs up            Physical Exam:        Last Vital Signs:  /69 (BP Location: Right arm, Patient Position: Semi-Shah's, Cuff Size: Adult Regular)   Pulse 67   Temp 99  F (37.2  C) (Oral)   Resp 18   Ht 1.702 m (5' 7\")   Wt 70.3 kg (155 lb)   SpO2 93%   BMI 24.28 kg/m      I/O last 3 completed shifts:  In: 1975 [P.O.:1420; I.V.:555]  Out: 2950 [Urine:2950]    Wt Readings from Last 5 " "Encounters:   01/28/25 70.3 kg (155 lb)   02/26/15 79.4 kg (175 lb)      Exam improving daily  Constitutional: Awake, alert, cooperative, no apparent distress, sitting up in bed, smiling     Respiratory: Clear to auscultation bilaterally, no crackles or wheezing, good air movement, on 2 lpm oxygen   Cardiovascular: Regular rate and rhythm, normal S1 and S2, and no murmur noted   Abdomen: Normal bowel sounds, soft, non-distended, non-tender   Neuro: Alert with  no new focal weakness   Extremities: No edema, GONZALEZ   Other(s):        All other systems: Negative          Medications:        All current medications were reviewed with changes reflected in problem list. Orders cleaned up         Data:      All new lab and imaging data was reviewed.      Data reviewed today: I reviewed all new labs and imaging results over the last 24 hours. I personally reviewed       Recent Labs   Lab 01/27/25  0544 01/26/25  0536 01/25/25  2229   WBC 11.8* 7.9 9.3   HGB 13.1* 12.2* 12.6*   HCT 37.4* 34.0* 35.1*   MCV 86 84 84    153 178     No results for input(s): \"CULT\" in the last 168 hours.  Recent Labs   Lab 01/27/25  0544 01/27/25  0201 01/26/25  2212 01/26/25  1819 01/26/25  1544 01/26/25  1337 01/26/25  1206 01/26/25  0821 01/26/25  0536 01/26/25  0254 01/25/25  2229   *  129* 128* 128*   < >  --    < >  --    < > 116*  116*   < > 111*   POTASSIUM 4.0  --   --   --   --   --   --   --  3.6  --  3.9   CHLORIDE 97*  --   --   --   --   --   --   --  85*  --  80*   CO2 24  --   --   --   --   --   --   --  21*  --  21*   ANIONGAP 8  --   --   --   --   --   --   --  10  --  10   *  --   --   --  145*  --  189*   < > 146*  --  102*   BUN 13.0  --   --   --   --   --   --   --  14.3  --  13.9   CR 0.95  --   --   --   --   --   --   --  1.01  --  0.98   GFRESTIMATED >90  --   --   --   --   --   --   --  >90  --  >90   GUERRERO 7.9*  --   --   --   --   --   --   --  7.9*  --  8.0*   PROTTOTAL  --   --   --   --   --   " "--   --   --   --   --  6.8   ALBUMIN  --   --   --   --   --   --   --   --   --   --  3.9   BILITOTAL  --   --   --   --   --   --   --   --   --   --  0.5   ALKPHOS  --   --   --   --   --   --   --   --   --   --  67   AST  --   --   --   --   --   --   --   --   --   --  63*   ALT  --   --   --   --   --   --   --   --   --   --  30    < > = values in this interval not displayed.       Recent Labs   Lab 01/27/25  0544 01/26/25  1544 01/26/25  1206 01/26/25  0821 01/26/25  0536   * 145* 189* 169* 146*       No results for input(s): \"INR\" in the last 168 hours.      No results for input(s): \"TROPONIN\", \"TROPI\", \"TROPR\" in the last 168 hours.    Invalid input(s): \"TROP\", \"TROPONINIES\"    Recent Results (from the past 48 hours)   Chest XR,  PA & LAT    Narrative    EXAM: XR CHEST 2 VIEWS  LOCATION: [facility]  DATE: 1/25/2025    INDICATION: fever  COMPARISON: None.      Impression    IMPRESSION: Abnormal parenchymal opacity in the perihilar right upper lobe and in the left lower lobe suspected to represent bilateral pneumonia. Otherwise negative chest x-ray.       COVID Status: FULL  COVID-19 PCR Results          1/25/2025    22:29   COVID-19 PCR Results   SARS CoV2 PCR Negative      COVID-19 Antibody Results, Testing for Immunity           No data to display                This document was created using voice recognition technology.  Please excuse any typographical errors that may have occurred.  Please call with any questions.       "

## 2025-01-29 VITALS
WEIGHT: 155 LBS | BODY MASS INDEX: 24.33 KG/M2 | DIASTOLIC BLOOD PRESSURE: 68 MMHG | HEIGHT: 67 IN | SYSTOLIC BLOOD PRESSURE: 114 MMHG | RESPIRATION RATE: 20 BRPM | OXYGEN SATURATION: 90 % | TEMPERATURE: 98.4 F | HEART RATE: 62 BPM

## 2025-01-29 PROCEDURE — 250N000013 HC RX MED GY IP 250 OP 250 PS 637: Performed by: INTERNAL MEDICINE

## 2025-01-29 PROCEDURE — 250N000009 HC RX 250: Performed by: INTERNAL MEDICINE

## 2025-01-29 PROCEDURE — 99239 HOSP IP/OBS DSCHRG MGMT >30: CPT | Performed by: INTERNAL MEDICINE

## 2025-01-29 RX ORDER — DILTIAZEM HYDROCHLORIDE EXTENDED-RELEASE TABLETS 120 MG/1
120 TABLET, EXTENDED RELEASE ORAL DAILY
Qty: 30 TABLET | Refills: 0 | Status: SHIPPED | OUTPATIENT
Start: 2025-01-29 | End: 2025-02-28

## 2025-01-29 RX ORDER — CEFDINIR 300 MG/1
300 CAPSULE ORAL EVERY 12 HOURS
Qty: 6 CAPSULE | Refills: 0 | Status: SHIPPED | OUTPATIENT
Start: 2025-01-29 | End: 2025-02-01

## 2025-01-29 RX ORDER — BENZONATATE 100 MG/1
100 CAPSULE ORAL 3 TIMES DAILY PRN
Qty: 20 CAPSULE | Refills: 0 | Status: SHIPPED | OUTPATIENT
Start: 2025-01-29

## 2025-01-29 RX ADMIN — FLUTICASONE PROPIONATE 1 SPRAY: 50 SPRAY, METERED NASAL at 09:08

## 2025-01-29 RX ADMIN — LACTASE TAB 3000 UNIT 9000 UNITS: 3000 TAB at 09:20

## 2025-01-29 RX ADMIN — CALCIUM 500 MG: 500 TABLET ORAL at 09:03

## 2025-01-29 RX ADMIN — ALBUTEROL SULFATE 2.5 MG: 2.5 SOLUTION RESPIRATORY (INHALATION) at 10:07

## 2025-01-29 RX ADMIN — DILTIAZEM HYDROCHLORIDE 180 MG: 180 CAPSULE, COATED, EXTENDED RELEASE ORAL at 09:03

## 2025-01-29 RX ADMIN — GUAIFENESIN 600 MG: 600 TABLET ORAL at 09:03

## 2025-01-29 RX ADMIN — ESCITALOPRAM OXALATE 20 MG: 20 TABLET ORAL at 09:03

## 2025-01-29 RX ADMIN — MEMANTINE 10 MG: 5 TABLET ORAL at 09:03

## 2025-01-29 RX ADMIN — CETIRIZINE HYDROCHLORIDE 10 MG: 10 TABLET, FILM COATED ORAL at 09:03

## 2025-01-29 RX ADMIN — ACETYLCYSTEINE 2 ML: 200 SOLUTION ORAL; RESPIRATORY (INHALATION) at 10:07

## 2025-01-29 RX ADMIN — ACETAMINOPHEN 1000 MG: 500 TABLET, FILM COATED ORAL at 09:02

## 2025-01-29 RX ADMIN — CEFDINIR 300 MG: 300 CAPSULE ORAL at 09:03

## 2025-01-29 RX ADMIN — OSELTAMIVIR PHOSPHATE 75 MG: 75 CAPSULE ORAL at 09:03

## 2025-01-29 ASSESSMENT — ACTIVITIES OF DAILY LIVING (ADL)
ADLS_ACUITY_SCORE: 55

## 2025-01-29 NOTE — PLAN OF CARE
"Care from 23:00-07:30  Inpatient Progress Note - MS3  For vital signs and complete assessments, please see documentation flowsheets.     ORIENTATION: Alertx3-4; [place], hearing aids at bedside.  VSS.  PAIN: Denies pain, CP, SOB, n/v.  O2: Able to wean this shift from 1L NC to RA; satting 91-94% on RA; momentarily dipped to 86-89 around 3am while pt was sleeping, within a few minutes returned to >90%.  GI/: Uses urinal at bedside, needs reminders to void.  ACTIVITY: SBA  DIET: Low lactose  LINES/DRAINS: PIV SL  PLAN: Pending potential discharge.    Received a call from Rukhsana  manager, at 1am inquiring about the pt's current status and pending discharge.    Goal Outcome Evaluation:      Plan of Care Reviewed With: patient    Overall Patient Progress: improvingOverall Patient Progress: improving    Outcome Evaluation: On 1L NC, attempts to wean; on PO ABX; denies pain.    Problem: Adult Inpatient Plan of Care  Goal: Plan of Care Review  Description: The Plan of Care Review/Shift note should be completed every shift.  The Outcome Evaluation is a brief statement about your assessment that the patient is improving, declining, or no change.  This information will be displayed automatically on your shift  note.  Outcome: Progressing  Flowsheets (Taken 1/29/2025 0056)  Outcome Evaluation:   On 1L NC, attempts to wean   on PO ABX   denies pain.  Plan of Care Reviewed With: patient  Overall Patient Progress: improving  Goal: Patient-Specific Goal (Individualized)  Description: You can add care plan individualizations to a care plan. Examples of Individualization might be:  \"Parent requests to be called daily at 9am for status\", \"I have a hard time hearing out of my right ear\", or \"Do not touch me to wake me up as it startles  me\".  Outcome: Progressing  Goal: Absence of Hospital-Acquired Illness or Injury  Outcome: Progressing  Intervention: Identify and Manage Fall Risk  Recent Flowsheet Documentation  Taken 1/29/2025 0034 " by Moody, Addie, RN  Safety Promotion/Fall Prevention:   safety round/check completed   supervised activity   room organization consistent   room near nurse's station   room door open   patient and family education   nonskid shoes/slippers when out of bed   mobility aid in reach   lighting adjusted   increase visualization of patient   increased rounding and observation   clutter free environment maintained   assistive device/personal items within reach   activity supervised  Intervention: Prevent Skin Injury  Recent Flowsheet Documentation  Taken 1/29/2025 0034 by Addie Moody RN  Body Position: position changed independently  Intervention: Prevent and Manage VTE (Venous Thromboembolism) Risk  Recent Flowsheet Documentation  Taken 1/29/2025 0034 by Addie Moody RN  VTE Prevention/Management:   SCDs off (sequential compression devices)   patient refused intervention  Intervention: Prevent Infection  Recent Flowsheet Documentation  Taken 1/29/2025 0034 by Addie Moody RN  Infection Prevention:   rest/sleep promoted   hand hygiene promoted  Goal: Optimal Comfort and Wellbeing  Outcome: Progressing  Intervention: Provide Person-Centered Care  Recent Flowsheet Documentation  Taken 1/29/2025 0034 by Addie Moody RN  Trust Relationship/Rapport:   care explained   choices provided   thoughts/feelings acknowledged  Goal: Readiness for Transition of Care  Outcome: Progressing

## 2025-01-29 NOTE — DISCHARGE SUMMARY
[facility]  Hospitalist Discharge Summary      Date of Admission:  1/25/2025  Date of Discharge:  1/29/2025  Discharging Provider: John Woo MD  Discharge Service: Hospitalist Service  Primary Care Physician   Kenneth G. Pallas    Discharge Diagnoses   Acute hypoxic respiratory failure   Influenza A infection  Possible bacterial pneumonia  Severe hyponatremia  Down's syndrome  Dementia  Hearing loss  Hypertension  Lactose intolerant     Hospital Course   Henry Benson is a 49 year old male with PMH including Down syndrome living in a group home, dementia,  Hearing loss, psoriasis, hypertension, and mild chronic hyponatremia who presented from his group home for vomiting and fevers. Found to have hypoxic respiratory failure related to the flu and possible bacterial pneumonia.        Problem List with Assessment and Plan:     Acute hypoxemic respiratory failure, Influenza A infection, Possible bacterial pneumonia:   --Presented from his group home after vomiting and  fever.  He also reported some diarrhea.  Coughing audibly during his encounter with upper respiratory gurgling audible from outside the room.  Progressive hypoxia in the ER into the low 80s required 15 L by facemask then bipap.  Temperature was up at 100.3  F.  Influenza A PCR was positive.  Severe hyponatremia as below.  No leukocytosis.  Chest x-ray does show right upper lobe perihilar and left lower lobe opacities consistent with bilateral pneumonia.  Suspect he may have aspirated and has developed aspiration pneumonitis or aspiration pneumonia.  He received Tamiflu, IV ceftriaxone/azithromycin, 2 DuoNebs, and 10 mg IV dexamethasone in the ER.  Per ER note from 4/2021 history included asthma .  He was placed in the ICU  given progressive respiratory failure and severe hyponatremia with bipap needed. Over time his bipap and oxygen were weaned down to room air.  His antibiotics were changed to oral and continued on cough  medications with omnicef, azithromycin, and tamiflu.        Severe hyponatremia:   Initial sodium level low at 111, chloride 80, creatinine 0.98.   patient was dehydrated.  Patient was treated with normal saline and subsequently with D5W in consultation with nephrology.   His serum sodium improved to 1230  on 1/28/25      Down syndrome, Dementia, Hearing loss:   Lives in a group home setting.  He is on Namenda and donepezil, presumably also has early onset dementia which is common in Down syndrome.   Continue home medications         Hypertension :   Resumed PTA diltiazem ER but blood pressure on the lower side so we did decrease his diltiazem dose     Lactose intolerant:   When allowed diet recommend no lactose diet or providing Lactaid.       Clinically Significant Risk Factors          Significant Results and Procedures   Most Recent 3 CBC's:  Recent Labs   Lab Test 01/28/25  0835 01/27/25  0544 01/26/25  0536   WBC 7.0 11.8* 7.9   HGB 13.0* 13.1* 12.2*   MCV 87 86 84    174 153     Most Recent 3 BMP's:  Recent Labs   Lab Test 01/28/25  0835 01/27/25  0544 01/27/25  0201 01/26/25  1819 01/26/25  1544 01/26/25  0821 01/26/25  0536   * 129*  129* 128*   < >  --    < > 116*  116*   POTASSIUM 4.1 4.0  --   --   --   --  3.6   CHLORIDE 97* 97*  --   --   --   --  85*   CO2 23 24  --   --   --   --  21*   BUN 11.7 13.0  --   --   --   --  14.3   CR 0.92 0.95  --   --   --   --  1.01   ANIONGAP 10 8  --   --   --   --  10   GUERRERO 8.2* 7.9*  --   --   --   --  7.9*   GLC 90 146*  --   --  145*   < > 146*    < > = values in this interval not displayed.   ,   Results for orders placed or performed during the hospital encounter of 01/25/25   Chest XR,  PA & LAT    Narrative    EXAM: XR CHEST 2 VIEWS  LOCATION: [facility]  DATE: 1/25/2025    INDICATION: fever  COMPARISON: None.      Impression    IMPRESSION: Abnormal parenchymal opacity in the perihilar right upper lobe and in the left  lower lobe suspected to represent bilateral pneumonia. Otherwise negative chest x-ray.           Follow up/instructions: patient needs to see his primary care provider in a week for a sodium check and blood pressure check.      Pending test results at discharge:      Unresulted Labs Ordered in the Past 30 Days of this Admission       No orders found from 12/26/2024 to 1/26/2025.             Discharge Orders      Reason for your hospital stay    FLU     Activity    Your activity upon discharge: activity as tolerated     Follow-up and recommended labs and tests     Follow up with primary care provider, Kenneth G. Pallas, within 7 days for hospital follow- up.  The following labs/tests are recommended: BMP follow up sodium and breathing.  Needs blood pressure check as on a lower diltiazem dose     Diet    Follow this diet upon discharge: Current Diet:Orders Placed This Encounter      Low Lactose Diet       Discharge Disposition   Discharged to group home  Condition at discharge: Stable      Consultations This Hospital Stay   CARE MANAGEMENT / SOCIAL WORK IP CONSULT  NEPHROLOGY IP CONSULT  CARE MANAGEMENT / SOCIAL WORK IP CONSULT  CARE MANAGEMENT / SOCIAL WORK IP CONSULT    Code Status   Full Code    Time Spent on this Encounter   I, John Woo MD, personally saw the patient today and spent greater than 30 minutes discharging this patient. Discussed with nursing, social work and case management          This document was created using voice recognition technology.  Please excuse any typographical errors that may have occurred.  Please call with any questions.       John Woo MD  [place] [id number] [facility]  [address] [place] [address]  Phone: [phone]  Fax: [phone]  ______________________________________________________________________    Physical Exam   Vital Signs: Temp: 97.4  F (36.3  C) Temp src: Oral BP: 118/72 Pulse: 64   Resp: 20 SpO2: (!) 91 % O2 Device:  None (Room air) Oxygen Delivery: 1 LPM  Weight: 155 lbs 0 oz      Exam improving daily, now off of oxygen  Constitutional: Awake, alert, cooperative, no apparent distress, sitting up in bed, smiling     Respiratory: Clear to auscultation bilaterally, no crackles or wheezing, good air movement,    Cardiovascular: Regular rate and rhythm, normal S1 and S2, and no murmur noted   Abdomen: Normal bowel sounds, soft, non-distended, non-tender   Neuro: Alert with  no new focal weakness   Extremities: No edema, GONZALEZ                 Discharge Medications   Current Discharge Medication List        START taking these medications    Details   benzonatate (TESSALON) 100 MG capsule Take 1 capsule (100 mg) by mouth 3 times daily as needed for cough.  Qty: 20 capsule, Refills: 0    Associated Diagnoses: Influenza A      cefdinir (OMNICEF) 300 MG capsule Take 1 capsule (300 mg) by mouth every 12 hours for 3 days.  Qty: 6 capsule, Refills: 0    Associated Diagnoses: Pneumonia due to infectious organism, unspecified laterality, unspecified part of lung      diltiazem ER (CARDIAZEM LA) 120 MG 24 hr tablet Take 1 tablet (120 mg) by mouth daily.  Qty: 30 tablet, Refills: 0    Associated Diagnoses: Influenza A           CONTINUE these medications which have NOT CHANGED    Details   acetaminophen (TYLENOL) 500 MG tablet Take 1,000 mg by mouth 3 times daily.      calcium carbonate (OS-GUERRERO) 500 MG TABS Take 1 tablet by mouth 2 times daily. At 0800 and 1700      Calcium Polycarbophil (FIBER LAXATIVE PO) Take by mouth daily as needed (consitpation).      cetirizine (ZYRTEC) 10 MG tablet Take 10 mg by mouth daily.      donepezil (ARICEPT) 10 MG tablet Take 10 mg by mouth at bedtime.      escitalopram (LEXAPRO) 20 MG tablet Take 20 mg by mouth daily.      fluticasone (FLONASE) 50 MCG/ACT nasal spray Spray 1 spray into both nostrils daily.      hydrocortisone (CORTAID) 1 % external cream Apply topically 2 times daily as needed for rash or itching.       ibuprofen (ADVIL/MOTRIN) 200 MG tablet Take 200 mg by mouth every 4 hours as needed for pain.      lactase (LACTAID) 3000 UNIT tablet Take 9,000-27,000 Units by mouth 3 times daily (with meals).      melatonin 5 MG tablet Take 5 mg by mouth at bedtime.      memantine (NAMENDA) 10 MG tablet Take 10 mg by mouth 2 times daily.      mometasone (ELOCON) 0.1 % external cream Apply topically daily.      ondansetron (ZOFRAN ODT) 4 MG ODT tab Take 4 mg by mouth every 8 hours as needed for nausea.      tacrolimus (PROTOPIC) 0.03 % external ointment Apply topically 2 times daily as needed (rash and skin irritation).      vitamin D3 (CHOLECALCIFEROL) 250 mcg (26570 units) capsule Take 1 capsule by mouth daily.           STOP taking these medications       diltiazem (TIAZAC) 240 MG 24 hr ER beaded capsule Comments:   Reason for Stopping:             Allergies   Allergies   Allergen Reactions    Influenza Vaccines     Pcn [Penicillins] Hives    Sulfa Antibiotics Hives

## 2025-01-29 NOTE — PROGRESS NOTES
Patient stable for discharge. Discharge medications sent with patient. Personal belongings returned.  IV removed. AVS instructions covered with patient and group home staff.  Group home staff to provide transportation.

## 2025-01-29 NOTE — PLAN OF CARE
"Goal Outcome Evaluation:      Plan of Care Reviewed With: patient    Overall Patient Progress: improvingOverall Patient Progress: improving    Outcome Evaluation: O2 at 1L, productive cough, PO zithromax & omnicef, RT following for nebs,    No c/o pain. Up with SBA, snow regular diet. Voiding in large amts. CULLEN, desats with activity on room air      Problem: Adult Inpatient Plan of Care  Goal: Plan of Care Review  Description: The Plan of Care Review/Shift note should be completed every shift.  The Outcome Evaluation is a brief statement about your assessment that the patient is improving, declining, or no change.  This information will be displayed automatically on your shift  note.  Outcome: Progressing  Flowsheets (Taken 1/28/2025 2301)  Outcome Evaluation: O2 at 1L, productive cough, PO zithromax & omnicef, RT following for nebs,  Plan of Care Reviewed With: patient  Overall Patient Progress: improving  Goal: Patient-Specific Goal (Individualized)  Description: You can add care plan individualizations to a care plan. Examples of Individualization might be:  \"Parent requests to be called daily at 9am for status\", \"I have a hard time hearing out of my right ear\", or \"Do not touch me to wake me up as it startles  me\".  Outcome: Progressing  Goal: Absence of Hospital-Acquired Illness or Injury  Outcome: Progressing  Intervention: Identify and Manage Fall Risk  Recent Flowsheet Documentation  Taken 1/28/2025 1602 by Hailee Dunn, RN  Safety Promotion/Fall Prevention:   activity supervised   assistive device/personal items within reach   clutter free environment maintained   increased rounding and observation   nonskid shoes/slippers when out of bed   patient and family education   safety round/check completed   supervised activity   toileting scheduled  Intervention: Prevent Skin Injury  Recent Flowsheet Documentation  Taken 1/28/2025 1602 by Hailee Dunn RN  Body Position:   position changed " independently   supine, head elevated  Intervention: Prevent and Manage VTE (Venous Thromboembolism) Risk  Recent Flowsheet Documentation  Taken 1/28/2025 1602 by Hailee Dunn RN  VTE Prevention/Management: SCDs off (sequential compression devices)  Goal: Optimal Comfort and Wellbeing  Outcome: Progressing  Intervention: Monitor Pain and Promote Comfort  Recent Flowsheet Documentation  Taken 1/28/2025 2115 by Hailee Dunn RN  Pain Management Interventions: medication (see MAR)  Goal: Readiness for Transition of Care  Outcome: Progressing     Problem: Gas Exchange Impaired  Goal: Optimal Gas Exchange  Outcome: Progressing     Problem: Electrolyte Imbalance  Goal: Electrolyte Balance  Outcome: Progressing     Problem: Comorbidity Management  Goal: Blood Pressure in Desired Range  Outcome: Progressing     Problem: Pneumonia  Goal: Fluid Balance  Outcome: Progressing  Goal: Resolution of Infection Signs and Symptoms  Outcome: Progressing  Intervention: Prevent Infection Progression  Recent Flowsheet Documentation  Taken 1/28/2025 1602 by Hailee Dunn RN  Isolation Precautions: droplet precautions maintained  Goal: Effective Oxygenation and Ventilation  Outcome: Progressing  Intervention: Promote Airway Secretion Clearance  Recent Flowsheet Documentation  Taken 1/28/2025 1613 by Hailee Dunn RN  Activity Management: standing at bedside  Taken 1/28/2025 1602 by Hailee Dunn RN  Cough And Deep Breathing: done with encouragement

## 2025-06-16 ENCOUNTER — LAB REQUISITION (OUTPATIENT)
Dept: LAB | Facility: CLINIC | Age: 50
End: 2025-06-16
Payer: COMMERCIAL

## 2025-06-16 DIAGNOSIS — F32.5 MAJOR DEPRESSIVE DISORDER, SINGLE EPISODE, IN FULL REMISSION: ICD-10-CM

## 2025-06-16 DIAGNOSIS — G47.00 INSOMNIA, UNSPECIFIED: ICD-10-CM

## 2025-06-16 DIAGNOSIS — E55.9 VITAMIN D DEFICIENCY, UNSPECIFIED: ICD-10-CM

## 2025-06-16 DIAGNOSIS — I10 ESSENTIAL (PRIMARY) HYPERTENSION: ICD-10-CM

## 2025-06-16 LAB
ALBUMIN SERPL BCG-MCNC: 4.1 G/DL (ref 3.5–5.2)
ALP SERPL-CCNC: 77 U/L (ref 40–150)
ALT SERPL W P-5'-P-CCNC: 25 U/L (ref 0–70)
ANION GAP SERPL CALCULATED.3IONS-SCNC: 10 MMOL/L (ref 7–15)
AST SERPL W P-5'-P-CCNC: 30 U/L (ref 0–45)
BASOPHILS # BLD AUTO: 0.1 10E3/UL (ref 0–0.2)
BASOPHILS NFR BLD AUTO: 3 %
BILIRUB SERPL-MCNC: 0.5 MG/DL
BUN SERPL-MCNC: 12 MG/DL (ref 6–20)
CALCIUM SERPL-MCNC: 9.2 MG/DL (ref 8.8–10.4)
CHLORIDE SERPL-SCNC: 97 MMOL/L (ref 98–107)
CHOLEST SERPL-MCNC: 258 MG/DL
CREAT SERPL-MCNC: 1.03 MG/DL (ref 0.67–1.17)
EGFRCR SERPLBLD CKD-EPI 2021: 90 ML/MIN/1.73M2
EOSINOPHIL # BLD AUTO: 0.2 10E3/UL (ref 0–0.7)
EOSINOPHIL NFR BLD AUTO: 4 %
ERYTHROCYTE [DISTWIDTH] IN BLOOD BY AUTOMATED COUNT: 14 % (ref 10–15)
EST. AVERAGE GLUCOSE BLD GHB EST-MCNC: 108 MG/DL
FASTING STATUS PATIENT QL REPORTED: ABNORMAL
GLUCOSE SERPL-MCNC: 78 MG/DL (ref 70–99)
HBA1C MFR BLD: 5.4 %
HCO3 SERPL-SCNC: 29 MMOL/L (ref 22–29)
HCT VFR BLD AUTO: 49 % (ref 40–53)
HDLC SERPL-MCNC: 55 MG/DL
HGB BLD-MCNC: 16.7 G/DL (ref 13.3–17.7)
IMM GRANULOCYTES # BLD: 0 10E3/UL
IMM GRANULOCYTES NFR BLD: 1 %
LDLC SERPL CALC-MCNC: 175 MG/DL
LYMPHOCYTES # BLD AUTO: 1.9 10E3/UL (ref 0.8–5.3)
LYMPHOCYTES NFR BLD AUTO: 45 %
MCH RBC QN AUTO: 30.1 PG (ref 26.5–33)
MCHC RBC AUTO-ENTMCNC: 34.1 G/DL (ref 31.5–36.5)
MCV RBC AUTO: 88 FL (ref 78–100)
MONOCYTES # BLD AUTO: 0.4 10E3/UL (ref 0–1.3)
MONOCYTES NFR BLD AUTO: 9 %
NEUTROPHILS # BLD AUTO: 1.6 10E3/UL (ref 1.6–8.3)
NEUTROPHILS NFR BLD AUTO: 39 %
NONHDLC SERPL-MCNC: 203 MG/DL
NRBC # BLD AUTO: 0 10E3/UL
NRBC BLD AUTO-RTO: 0 /100
PLATELET # BLD AUTO: 286 10E3/UL (ref 150–450)
POTASSIUM SERPL-SCNC: 4.3 MMOL/L (ref 3.4–5.3)
PROT SERPL-MCNC: 7.3 G/DL (ref 6.4–8.3)
RBC # BLD AUTO: 5.54 10E6/UL (ref 4.4–5.9)
SODIUM SERPL-SCNC: 136 MMOL/L (ref 135–145)
TRIGL SERPL-MCNC: 142 MG/DL
TSH SERPL DL<=0.005 MIU/L-ACNC: 0.91 UIU/ML (ref 0.3–4.2)
VIT D+METAB SERPL-MCNC: 31 NG/ML (ref 20–50)
WBC # BLD AUTO: 4.2 10E3/UL (ref 4–11)

## 2025-06-16 PROCEDURE — 80061 LIPID PANEL: CPT | Mod: ORL

## 2025-06-16 PROCEDURE — 80053 COMPREHEN METABOLIC PANEL: CPT | Mod: ORL

## 2025-06-16 PROCEDURE — 83036 HEMOGLOBIN GLYCOSYLATED A1C: CPT | Mod: ORL

## 2025-06-16 PROCEDURE — 84443 ASSAY THYROID STIM HORMONE: CPT | Mod: ORL

## 2025-06-16 PROCEDURE — 85025 COMPLETE CBC W/AUTO DIFF WBC: CPT | Mod: ORL

## 2025-06-16 PROCEDURE — 82306 VITAMIN D 25 HYDROXY: CPT | Mod: ORL
